# Patient Record
Sex: FEMALE | Race: BLACK OR AFRICAN AMERICAN | NOT HISPANIC OR LATINO
[De-identification: names, ages, dates, MRNs, and addresses within clinical notes are randomized per-mention and may not be internally consistent; named-entity substitution may affect disease eponyms.]

---

## 2021-05-10 ENCOUNTER — APPOINTMENT (OUTPATIENT)
Dept: NEUROLOGY | Facility: CLINIC | Age: 31
End: 2021-05-10
Payer: COMMERCIAL

## 2021-05-10 VITALS — WEIGHT: 254 LBS | HEIGHT: 66 IN | BODY MASS INDEX: 40.82 KG/M2

## 2021-05-10 DIAGNOSIS — I63.9 CEREBRAL INFARCTION, UNSPECIFIED: ICD-10-CM

## 2021-05-10 DIAGNOSIS — Z82.3 FAMILY HISTORY OF STROKE: ICD-10-CM

## 2021-05-10 DIAGNOSIS — G45.9 TRANSIENT CEREBRAL ISCHEMIC ATTACK, UNSPECIFIED: ICD-10-CM

## 2021-05-10 PROCEDURE — 93880 EXTRACRANIAL BILAT STUDY: CPT

## 2021-05-10 PROCEDURE — 99072 ADDL SUPL MATRL&STAF TM PHE: CPT

## 2021-05-10 PROCEDURE — 99205 OFFICE O/P NEW HI 60 MIN: CPT

## 2021-05-10 RX ORDER — RIBOFLAVIN (VITAMIN B2) 400 MG
400 TABLET ORAL
Qty: 30 | Refills: 0 | Status: ACTIVE | COMMUNITY
Start: 2021-04-14

## 2021-05-10 RX ORDER — MAGNESIUM OXIDE 241.3 MG/1000MG
400 TABLET ORAL
Qty: 30 | Refills: 0 | Status: ACTIVE | COMMUNITY
Start: 2021-04-14

## 2021-05-12 ENCOUNTER — NON-APPOINTMENT (OUTPATIENT)
Age: 31
End: 2021-05-12

## 2021-05-18 ENCOUNTER — NON-APPOINTMENT (OUTPATIENT)
Age: 31
End: 2021-05-18

## 2021-05-18 ENCOUNTER — APPOINTMENT (OUTPATIENT)
Dept: NEUROLOGY | Facility: CLINIC | Age: 31
End: 2021-05-18

## 2021-05-19 ENCOUNTER — APPOINTMENT (OUTPATIENT)
Dept: NEUROLOGY | Facility: CLINIC | Age: 31
End: 2021-05-19
Payer: COMMERCIAL

## 2021-05-19 VITALS — TEMPERATURE: 98 F

## 2021-05-19 PROCEDURE — 93892 TCD EMBOLI DETECT W/O INJ: CPT

## 2021-05-19 PROCEDURE — 93886 INTRACRANIAL COMPLETE STUDY: CPT

## 2021-06-02 ENCOUNTER — TRANSCRIPTION ENCOUNTER (OUTPATIENT)
Age: 31
End: 2021-06-02

## 2021-06-06 ENCOUNTER — OUTPATIENT (OUTPATIENT)
Dept: OUTPATIENT SERVICES | Facility: HOSPITAL | Age: 31
LOS: 1 days | End: 2021-06-06
Payer: COMMERCIAL

## 2021-06-06 ENCOUNTER — APPOINTMENT (OUTPATIENT)
Dept: MRI IMAGING | Facility: CLINIC | Age: 31
End: 2021-06-06
Payer: COMMERCIAL

## 2021-06-06 DIAGNOSIS — I63.9 CEREBRAL INFARCTION, UNSPECIFIED: ICD-10-CM

## 2021-06-06 PROCEDURE — 70544 MR ANGIOGRAPHY HEAD W/O DYE: CPT | Mod: 26,59

## 2021-06-06 PROCEDURE — 70551 MRI BRAIN STEM W/O DYE: CPT | Mod: 26

## 2021-06-06 PROCEDURE — 70544 MR ANGIOGRAPHY HEAD W/O DYE: CPT

## 2021-06-06 PROCEDURE — 70547 MR ANGIOGRAPHY NECK W/O DYE: CPT | Mod: 26

## 2021-06-06 PROCEDURE — 70551 MRI BRAIN STEM W/O DYE: CPT

## 2021-06-06 PROCEDURE — 70547 MR ANGIOGRAPHY NECK W/O DYE: CPT

## 2021-06-07 ENCOUNTER — APPOINTMENT (OUTPATIENT)
Dept: NEUROLOGY | Facility: CLINIC | Age: 31
End: 2021-06-07
Payer: COMMERCIAL

## 2021-06-07 VITALS
HEIGHT: 66 IN | BODY MASS INDEX: 41.62 KG/M2 | HEART RATE: 84 BPM | WEIGHT: 259 LBS | SYSTOLIC BLOOD PRESSURE: 133 MMHG | DIASTOLIC BLOOD PRESSURE: 82 MMHG

## 2021-06-07 DIAGNOSIS — R93.0 ABNORMAL FINDINGS ON DIAGNOSTIC IMAGING OF SKULL AND HEAD, NOT ELSEWHERE CLASSIFIED: ICD-10-CM

## 2021-06-07 DIAGNOSIS — Z78.9 OTHER SPECIFIED HEALTH STATUS: ICD-10-CM

## 2021-06-07 DIAGNOSIS — R03.0 ELEVATED BLOOD-PRESSURE READING, W/OUT DIAGNOSIS OF HYPERTENSION: ICD-10-CM

## 2021-06-07 DIAGNOSIS — G44.59 OTHER COMPLICATED HEADACHE SYNDROME: ICD-10-CM

## 2021-06-07 DIAGNOSIS — Z00.00 ENCOUNTER FOR GENERAL ADULT MEDICAL EXAMINATION W/OUT ABNORMAL FINDINGS: ICD-10-CM

## 2021-06-07 PROCEDURE — 99215 OFFICE O/P EST HI 40 MIN: CPT

## 2021-06-07 NOTE — HISTORY OF PRESENT ILLNESS
[FreeTextEntry1] : Patient reports that she is here for evaluation of numbness and tingling in her arms and legs with associated headaches. 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 \par \par Currently she denies any headaches or nausea or vomiting and no visual changes currently. No leg weakness or numbness\par No dizziness or pain reported.

## 2021-06-07 NOTE — DISCUSSION/SUMMARY
[FreeTextEntry1] : 1. Patient with neurologic symptoms of headaches and numbness and tingling intermittently in right arm and bilateral legs\par \par MRI reviewed in detail with neuroradiology and showed no acute infarct however she had a couple of nonspecific white matter changes and I will monitor them regularly every year\par \par 2. Obtain MRI cervical and thoracic spine without contrast \par \par 3. Keep headache diary and neurologic symptom diary

## 2021-06-07 NOTE — REVIEW OF SYSTEMS
[Fever] : no fever [Chills] : no chills [Feeling Tired] : not feeling tired [Abnormal Sensation] : no abnormal sensation [Lightheadedness] : no lightheadedness [Dizziness] : no dizziness [Migraine Headache] : no migraine headache [Tension Headache] : tension-type headaches [Difficulty Walking] : no difficulty walking [As Noted in HPI] : as noted in HPI [Negative] : Heme/Lymph

## 2021-06-07 NOTE — PHYSICAL EXAM
[General Appearance - Alert] : alert [Oriented To Time, Place, And Person] : oriented to person, place, and time [Person] : oriented to person [Place] : oriented to place [Time] : oriented to time [Cranial Nerves Optic (II)] : visual acuity intact bilaterally,  visual fields full to confrontation, pupils equal round and reactive to light [Cranial Nerves Trigeminal (V)] : facial sensation intact symmetrically [Cranial Nerves Vestibulocochlear (VIII)] : hearing was intact bilaterally [Cranial Nerves Glossopharyngeal (IX)] : tongue and palate midline [Cranial Nerves Accessory (XI - Cranial And Spinal)] : head turning and shoulder shrug symmetric [Motor Tone] : muscle tone was normal in all four extremities [Motor Strength] : muscle strength was normal in all four extremities [Involuntary Movements] : no involuntary movements were seen [Motor Handedness Right-Handed] : the patient is right hand dominant [Paresis Pronator Drift Right-Sided] : no pronator drift on the right [Paresis Pronator Drift Left-Sided] : no pronator drift on the left [Romberg's Sign] : Romberg's sign was negtive [Limited Balance] : the patient's balance was impaired [Coordination - Dysmetria Impaired Finger-to-Nose Bilateral] : not present [2+] : Patella left 2+ [1+] : Ankle jerk left 1+ [FreeTextEntry5] : slow saccades and nystagmus noted on left lateral gaze  [FreeTextEntry7] : slightly decreased sensation on right arm to pinprick compared to left  [FreeTextEntry8] : patient with difficulty with tandem gait [Neck Appearance] : the appearance of the neck was normal [] : no rash [Skin Lesions] : no skin lesions

## 2021-07-09 ENCOUNTER — APPOINTMENT (OUTPATIENT)
Dept: NEUROLOGY | Facility: CLINIC | Age: 31
End: 2021-07-09
Payer: COMMERCIAL

## 2021-07-09 VITALS
BODY MASS INDEX: 41.62 KG/M2 | HEART RATE: 77 BPM | DIASTOLIC BLOOD PRESSURE: 79 MMHG | WEIGHT: 259 LBS | HEIGHT: 66 IN | SYSTOLIC BLOOD PRESSURE: 111 MMHG

## 2021-07-09 DIAGNOSIS — R20.2 PARESTHESIA OF SKIN: ICD-10-CM

## 2021-07-09 DIAGNOSIS — G43.909 MIGRAINE, UNSPECIFIED, NOT INTRACTABLE, W/OUT STATUS MIGRAINOSUS: ICD-10-CM

## 2021-07-09 DIAGNOSIS — R53.83 OTHER FATIGUE: ICD-10-CM

## 2021-07-09 PROCEDURE — 99214 OFFICE O/P EST MOD 30 MIN: CPT

## 2021-07-09 NOTE — DISCUSSION/SUMMARY
[FreeTextEntry1] : 1. Headaches : resolved with magnesium and will continue to monitor \par \par 2. fatigue : send blood work for B12, thiamine, folate and CRP\par \par 3. Patient may return to work on Monday July 12 th 2021\par \par 4. Follow up in 4 months and all questions answered.

## 2021-07-09 NOTE — PHYSICAL EXAM
[General Appearance - Alert] : alert [Oriented To Time, Place, And Person] : oriented to person, place, and time [Person] : oriented to person [Place] : oriented to place [Time] : oriented to time [Cranial Nerves Optic (II)] : visual acuity intact bilaterally,  visual fields full to confrontation, pupils equal round and reactive to light [Cranial Nerves Trigeminal (V)] : facial sensation intact symmetrically [Cranial Nerves Vestibulocochlear (VIII)] : hearing was intact bilaterally [Cranial Nerves Glossopharyngeal (IX)] : tongue and palate midline [Cranial Nerves Accessory (XI - Cranial And Spinal)] : head turning and shoulder shrug symmetric [Motor Tone] : muscle tone was normal in all four extremities [Motor Strength] : muscle strength was normal in all four extremities [Involuntary Movements] : no involuntary movements were seen [Motor Handedness Right-Handed] : the patient is right hand dominant [Paresis Pronator Drift Right-Sided] : no pronator drift on the right [Paresis Pronator Drift Left-Sided] : no pronator drift on the left [Romberg's Sign] : Romberg's sign was negtive [Limited Balance] : the patient's balance was impaired [Coordination - Dysmetria Impaired Finger-to-Nose Bilateral] : not present [2+] : Patella left 2+ [1+] : Ankle jerk left 1+ [FreeTextEntry5] : slow saccades but no nystagmus  [FreeTextEntry7] : slightly decreased sensation on right arm to pinprick compared to left  [Extraocular Movements] : extraocular movements were intact [Neck Appearance] : the appearance of the neck was normal [] : no rash [Skin Lesions] : no skin lesions

## 2021-07-09 NOTE — HISTORY OF PRESENT ILLNESS
[FreeTextEntry1] : Patient reports that since last visit she has been having increased pain in her neck and extreme fatigue. She was found to have low vitamin D. \par Her headaches have improved with magnesium daily. \par \par She completed her MRI cervical spine and it showed C3-C4 and C4-C5 central disc bulges but no shane nerve root compression \par \par MRI thoracic spine was normal \par \par She will have stiffness intermittently in her neck and overall she is feeling better with the numbness and tingling

## 2021-07-09 NOTE — REVIEW OF SYSTEMS
[Fever] : no fever [Chills] : no chills [Feeling Tired] : feeling tired [As Noted in HPI] : as noted in HPI [Poor Coordination] : good coordination [Abnormal Sensation] : no abnormal sensation [Dizziness] : no dizziness [Lightheadedness] : no lightheadedness [Migraine Headache] : no migraine headache [Difficulty Walking] : no difficulty walking [Sleep Disturbances] : no sleep disturbances [Joint Pain] : joint pain [Negative] : Heme/Lymph

## 2021-09-09 ENCOUNTER — APPOINTMENT (OUTPATIENT)
Dept: NEUROLOGY | Facility: CLINIC | Age: 31
End: 2021-09-09

## 2021-10-18 ENCOUNTER — APPOINTMENT (OUTPATIENT)
Dept: NEUROLOGY | Facility: CLINIC | Age: 31
End: 2021-10-18

## 2021-11-09 ENCOUNTER — APPOINTMENT (OUTPATIENT)
Dept: NEUROLOGY | Facility: CLINIC | Age: 31
End: 2021-11-09

## 2021-11-24 ENCOUNTER — APPOINTMENT (OUTPATIENT)
Dept: NEUROLOGY | Facility: CLINIC | Age: 31
End: 2021-11-24
Payer: COMMERCIAL

## 2021-11-24 PROCEDURE — 95910 NRV CNDJ TEST 7-8 STUDIES: CPT

## 2021-11-24 PROCEDURE — 95885 MUSC TST DONE W/NERV TST LIM: CPT

## 2022-02-15 ENCOUNTER — APPOINTMENT (OUTPATIENT)
Dept: NEUROLOGY | Facility: CLINIC | Age: 32
End: 2022-02-15

## 2022-02-19 ENCOUNTER — TRANSCRIPTION ENCOUNTER (OUTPATIENT)
Age: 32
End: 2022-02-19

## 2023-12-19 ENCOUNTER — NON-APPOINTMENT (OUTPATIENT)
Age: 33
End: 2023-12-19

## 2024-01-02 ENCOUNTER — NON-APPOINTMENT (OUTPATIENT)
Age: 34
End: 2024-01-02

## 2024-07-10 ENCOUNTER — APPOINTMENT (OUTPATIENT)
Dept: NEUROLOGY | Facility: CLINIC | Age: 34
End: 2024-07-10
Payer: COMMERCIAL

## 2024-07-10 ENCOUNTER — NON-APPOINTMENT (OUTPATIENT)
Age: 34
End: 2024-07-10

## 2024-07-10 VITALS
SYSTOLIC BLOOD PRESSURE: 131 MMHG | HEIGHT: 66 IN | DIASTOLIC BLOOD PRESSURE: 81 MMHG | TEMPERATURE: 97.8 F | OXYGEN SATURATION: 99 % | WEIGHT: 260 LBS | RESPIRATION RATE: 17 BRPM | HEART RATE: 72 BPM | BODY MASS INDEX: 41.78 KG/M2

## 2024-07-10 DIAGNOSIS — R93.0 ABNORMAL FINDINGS ON DIAGNOSTIC IMAGING OF SKULL AND HEAD, NOT ELSEWHERE CLASSIFIED: ICD-10-CM

## 2024-07-10 DIAGNOSIS — G43.909 MIGRAINE, UNSPECIFIED, NOT INTRACTABLE, W/OUT STATUS MIGRAINOSUS: ICD-10-CM

## 2024-07-10 DIAGNOSIS — R56.9 UNSPECIFIED CONVULSIONS: ICD-10-CM

## 2024-07-10 PROCEDURE — 99204 OFFICE O/P NEW MOD 45 MIN: CPT

## 2024-07-24 ENCOUNTER — APPOINTMENT (OUTPATIENT)
Dept: NEUROLOGY | Facility: CLINIC | Age: 34
End: 2024-07-24

## 2024-07-25 ENCOUNTER — APPOINTMENT (OUTPATIENT)
Dept: NEUROLOGY | Facility: CLINIC | Age: 34
End: 2024-07-25

## 2024-09-09 ENCOUNTER — OUTPATIENT (OUTPATIENT)
Dept: OUTPATIENT SERVICES | Facility: HOSPITAL | Age: 34
LOS: 1 days | Discharge: STILL ADMITTED OR ADMINISTRATIVE | End: 2024-09-09

## 2024-09-26 DIAGNOSIS — F43.11 POST-TRAUMATIC STRESS DISORDER, ACUTE: ICD-10-CM

## 2024-12-02 ENCOUNTER — OUTPATIENT (OUTPATIENT)
Dept: OUTPATIENT SERVICES | Facility: HOSPITAL | Age: 34
LOS: 1 days | Discharge: ANOTHER HOSPITAL | End: 2024-12-02
Payer: MEDICARE

## 2024-12-02 PROCEDURE — 90839 PSYTX CRISIS INITIAL 60 MIN: CPT

## 2024-12-03 DIAGNOSIS — F43.11 POST-TRAUMATIC STRESS DISORDER, ACUTE: ICD-10-CM

## 2025-02-05 ENCOUNTER — OUTPATIENT (OUTPATIENT)
Dept: OUTPATIENT SERVICES | Facility: HOSPITAL | Age: 35
LOS: 1 days | Discharge: HOME | End: 2025-02-05
Payer: MEDICARE

## 2025-02-05 PROCEDURE — 90792 PSYCH DIAG EVAL W/MED SRVCS: CPT

## 2025-02-06 DIAGNOSIS — F41.1 GENERALIZED ANXIETY DISORDER: ICD-10-CM

## 2025-02-21 ENCOUNTER — INPATIENT (INPATIENT)
Facility: HOSPITAL | Age: 35
LOS: 12 days | Discharge: HOME | End: 2025-03-06
Attending: PSYCHIATRY & NEUROLOGY | Admitting: PSYCHIATRY & NEUROLOGY
Payer: MEDICAID

## 2025-02-21 VITALS — WEIGHT: 179.9 LBS | TEMPERATURE: 99 F | HEIGHT: 66 IN

## 2025-02-21 DIAGNOSIS — J45.909 UNSPECIFIED ASTHMA, UNCOMPLICATED: ICD-10-CM

## 2025-02-21 DIAGNOSIS — F41.1 GENERALIZED ANXIETY DISORDER: ICD-10-CM

## 2025-02-21 LAB
FLUAV AG NPH QL: SIGNIFICANT CHANGE UP
FLUBV AG NPH QL: SIGNIFICANT CHANGE UP
RSV RNA NPH QL NAA+NON-PROBE: SIGNIFICANT CHANGE UP
SARS-COV-2 RNA SPEC QL NAA+PROBE: SIGNIFICANT CHANGE UP

## 2025-02-21 PROCEDURE — 99215 OFFICE O/P EST HI 40 MIN: CPT

## 2025-02-21 PROCEDURE — 90833 PSYTX W PT W E/M 30 MIN: CPT

## 2025-02-21 PROCEDURE — 99222 1ST HOSP IP/OBS MODERATE 55: CPT

## 2025-02-21 RX ORDER — ALBUTEROL SULFATE 2.5 MG/3ML
2 VIAL, NEBULIZER (ML) INHALATION EVERY 6 HOURS
Refills: 0 | Status: DISCONTINUED | OUTPATIENT
Start: 2025-02-21 | End: 2025-03-06

## 2025-02-21 RX ORDER — ACETAMINOPHEN 500 MG/5ML
650 LIQUID (ML) ORAL EVERY 6 HOURS
Refills: 0 | Status: DISCONTINUED | OUTPATIENT
Start: 2025-02-21 | End: 2025-03-06

## 2025-02-21 RX ORDER — LORAZEPAM 4 MG/ML
2 VIAL (ML) INJECTION ONCE
Refills: 0 | Status: DISCONTINUED | OUTPATIENT
Start: 2025-02-21 | End: 2025-02-25

## 2025-02-21 RX ORDER — MELATONIN 5 MG
5 TABLET ORAL AT BEDTIME
Refills: 0 | Status: DISCONTINUED | OUTPATIENT
Start: 2025-02-21 | End: 2025-03-06

## 2025-02-21 RX ORDER — LORAZEPAM 4 MG/ML
2 VIAL (ML) INJECTION EVERY 6 HOURS
Refills: 0 | Status: DISCONTINUED | OUTPATIENT
Start: 2025-02-21 | End: 2025-02-25

## 2025-02-21 NOTE — BH INPATIENT PSYCHIATRY ASSESSMENT NOTE - NSBHASSESSSUMMFT_PSY_ALL_CORE
33 yo female with BRYAN, no prior hospitalizations, no past SA, no hx of violence, medical hx of HTN, recurring kidney stones, asthma, admitted voluntarily from [facility] walk in for treatment of worsening anxiety associated with psychosocial stressors.     Patients presentation meets criteria for BRYAN with functional impairments such that inpatient stabilization will be beneficial. Hospitalization will aid with symptom stabilization, treatment optimization, coordination of outpatient services. no CO indicated at this time. Pt was offered Lexapro but she ultimately declines. Will explore further.     Plan:   Standard OBS   Pt declines psychopharm meds at this time   Lorazepam PRN for agitation   IGM therapy   Coordinate with s/w and rest of team

## 2025-02-21 NOTE — BH INPATIENT PSYCHIATRY ASSESSMENT NOTE - HPI (INCLUDE ILLNESS QUALITY, SEVERITY, DURATION, TIMING, CONTEXT, MODIFYING FACTORS, ASSOCIATED SIGNS AND SYMPTOMS)
34-year-old female, single, domiciled alone, non-caregiver,  for a college, PPH Anxiety, Depression, PTSD in chart; no previous psychiatric admissions, denies previous suicide attempts, denies NSSIB, [facility]/24 to establish care, previously engaged in therapy x10 years last seen in May 2024, brief med trial on Fluoxetine 10 mg but discontinued in 12/2024, admitted 9.13 for worsening anxiety and inability to function.     On arrival to , patient reports struggling with anxiety for several months, unable to identify a trigger. She states symptoms include muscle tension, excessive worries, worries about worries, social isolation. Patient also reports late insomnia, poor appetite, loss of interest. Firmly denies SI. she denies AVH. when asked of paranoia, she pauses and ultimately denies feeling paranoid, but states “some people aren’t nice.” She also feels he has been having a lot of trouble leaving her home. Asked of her goals for inpatient stay, she reports she wants exposure therapy and psychotherapy to help her not feel so socially anxious. We discuss medications, risks, benefits, side effects. We discuss trialing Lexapro. She agrees to trial, but then shortly after comes up to nurses and asks that the order not be put.     Per [facility] note:   Today, Pt presents for a walk in appt requesting voluntary hospitalization for worsening anxiety impairing her ability to care for herself, lost 14 pounds unintentionally. Acute stressors include feeling "bullied by colleagues". Pt odd at times, but not internally preoccupied, no evidence of ness or psychosis. Denied any SIIP, NSSIB, or HIIP. Total of 45 mins spent today assessing symptoms, reviewing expectations for medications & hospitalization, psychoeducation, supportive therapy, safety planning, and treatment planning.     MSE: Pt is alert, oriented x 3, calm and mostly cooperative, somewhat aloof, oddly related, casually dressed, fair eye contact with exotropia. Slow gait, no abnormal movements noted. Speech is spontaneous with normal rate, volume, tone. Mood is "anxious." Affect is anxious, restricted, mood congruent. Thought process is linear, future oriented. No evidence of delusional thought content. Denies any suicidal or violent ideation, intent, or plan. No auditory or visual hallucinations. Attention appropriate. Recent memory intact by interview. Fund of knowledge intact. Insight and judgment fair.    PMH: high blood pressure, migraines, asthma, hx of recurring kidney stones, anemia; no meds, allergies to contrast dye, soy and dairy, referred to clinic to establish care with individual therapy. Available charts reviewed. Current diagnostic impression is consistent with generalized anxiety disorder vs PTSD, we did not dive into details about her trauma at this first visit, PTSD is a reasonable diagnosis given her avoidance of crowds, hypervigilance; will continue to explore.

## 2025-02-21 NOTE — BH INPATIENT PSYCHIATRY ASSESSMENT NOTE - OTHER PAST PSYCHIATRIC HISTORY (INCLUDE DETAILS REGARDING ONSET, COURSE OF ILLNESS, INPATIENT/OUTPATIENT TREATMENT)
BRYAN, no prior hospitalizations. no past SA. previously in therapy. first connected to psychotherapy in 2015 for grief counseling after her father had passed away. brief trial of Fluoxetine 10mg discontinued to due intolerance

## 2025-02-21 NOTE — BH INPATIENT PSYCHIATRY ASSESSMENT NOTE - NSSUICPROTFACT_PSY_ALL_CORE
Neuro
Responsibility to children, family, or others/Identifies reasons for living/Supportive social network of family or friends/Fear of death or the actual act of killing self/Cultural, spiritual and/or moral attitudes against suicide/Engaged in work or school/Positive therapeutic relationships

## 2025-02-21 NOTE — BH INPATIENT PSYCHIATRY ASSESSMENT NOTE - RISK ASSESSMENT
Risk factors for self-harm / harm to others: biological sex, psychiatric diagnosis  Protective factors: inpatient and in a controlled setting with limited access to lethal means. not endorsing harm to self or others, able to contract for safety, fear of death.

## 2025-02-21 NOTE — BH PATIENT PROFILE - FALL HARM RISK - UNIVERSAL INTERVENTIONS
Bed in lowest position, wheels locked, appropriate side rails in place/Call bell, personal items and telephone in reach/Instruct patient to call for assistance before getting out of bed or chair/Non-slip footwear when patient is out of bed/[place] to call system/Physically safe environment - no spills, clutter or unnecessary equipment/Purposeful Proactive Rounding/Room/bathroom lighting operational, light cord in reach

## 2025-02-21 NOTE — BH INPATIENT PSYCHIATRY ASSESSMENT NOTE - CURRENT MEDICATION
MEDICATIONS  (STANDING):    MEDICATIONS  (PRN):  acetaminophen     Tablet .. 650 milliGRAM(s) Oral every 6 hours PRN Mild Pain (1 - 3), Moderate Pain (4 - 6)  LORazepam     Tablet 2 milliGRAM(s) Oral every 6 hours PRN agitation  LORazepam   Injectable 2 milliGRAM(s) IntraMuscular once PRN severe agitation  melatonin. 5 milliGRAM(s) Oral at bedtime PRN Insomnia

## 2025-02-21 NOTE — BH INPATIENT PSYCHIATRY ASSESSMENT NOTE - MSE UNSTRUCTURED FT
Appearance: Dressed appropriately.  Attitude / Behavior / relatedness: oddly-related. calm   Eye contact: good   Motor: No abnormal movements, no psychomotor slowing or activation.  Speech: Regular rate.  Mood: "fine"  Affect: anxious  Thought Process: linear.   Thought Content: devoid of SI and HI. +preoccupations   Perceptual: denies avh  Insight: fair   Judgment: good   Impulse control: good   Cognition: grossly intact  Gait: Intact.

## 2025-02-21 NOTE — CHART NOTE - NSCHARTNOTEFT_GEN_A_CORE
Screening Medical Evaluation    Patient Admitted from: [facility] admitting diagnosis: Generalized anxiety disorder      PAST MEDICAL & SURGICAL HISTORY:        Allergies    Soy (Stomach Upset)  IV Contrast (Anaphylaxis)    Intolerances          Social History:       FAMILY HISTORY:        MEDICATIONS  (STANDING):    MEDICATIONS  (PRN):  acetaminophen     Tablet .. 650 milliGRAM(s) Oral every 6 hours PRN Mild Pain (1 - 3), Moderate Pain (4 - 6)  LORazepam     Tablet 2 milliGRAM(s) Oral every 6 hours PRN agitation  LORazepam   Injectable 2 milliGRAM(s) IntraMuscular once PRN severe agitation  melatonin. 5 milliGRAM(s) Oral at bedtime PRN Insomnia        Vital Signs Last 24 Hrs  T(C): 37 (21 Feb 2025 16:21), Max: 37 (21 Feb 2025 16:21)  T(F): 98.6 (21 Feb 2025 16:21), Max: 98.6 (21 Feb 2025 16:21)  HR: --  BP: --  BP(mean): --  RR: --  SpO2: --      CAPILLARY BLOOD GLUCOSE            PHYSICAL EXAM:  GENERAL: NAD, well-developed  HEAD:  Atraumatic, Normocephalic  EYES: EOMI, PERRLA, conjunctiva and sclera clear  NECK: Supple, No JVD  CHEST/LUNG: Clear to auscultation bilaterally; No wheeze  HEART: Regular rate and rhythm; No murmurs, rubs, or gallops  ABDOMEN: Soft, Nontender, Nondistended; Bowel sounds present  EXTREMITIES:  2+ Peripheral Pulses, No clubbing, cyanosis, or edema  PSYCH: AAOx3  NEUROLOGY: non-focal  SKIN: No rashes or lesions    LABS:                    RADIOLOGY & ADDITIONAL TESTS:      Assessment and Plan:  __M/F admitted to [facility] for _____ w/ PMHx of ____ seen at bedside for medical screening evaluation. Patient has no acute complaints at this time. Patient denies fever, chills, headache, dizziness, lightheadedness, N/V, SOB, cough, congestion, chest pain, abdominal pain, dysuria, hematuria, diarrhea, constipation. Physical exam unremarkable, VSS. Labs _____. EKG _____.     1.)   2.)  3.) Screening Medical Evaluation    Patient Admitted from: [facility] admitting diagnosis: Generalized anxiety disorder      PAST MEDICAL & SURGICAL HISTORY:    Asthma.     Allergies    Soy (Stomach Upset)  IV Contrast (Anaphylaxis)    Intolerances          Social History:       FAMILY HISTORY:        MEDICATIONS  (STANDING):    MEDICATIONS  (PRN):  acetaminophen     Tablet .. 650 milliGRAM(s) Oral every 6 hours PRN Mild Pain (1 - 3), Moderate Pain (4 - 6)  LORazepam     Tablet 2 milliGRAM(s) Oral every 6 hours PRN agitation  LORazepam   Injectable 2 milliGRAM(s) IntraMuscular once PRN severe agitation  melatonin. 5 milliGRAM(s) Oral at bedtime PRN Insomnia        Vital Signs Last 24 Hrs  T(C): 37 (21 Feb 2025 16:21), Max: 37 (21 Feb 2025 16:21)  T(F): 98.6 (21 Feb 2025 16:21), Max: 98.6 (21 Feb 2025 16:21)  HR: -- 91b/ min   BP: -- 122/ 90   BP(mean): --  RR: -- 16b/ min       CAPILLARY BLOOD GLUCOSE            PHYSICAL EXAM: Limited due to pt uncooperative.   GENERAL: NAD  HEAD:  Atraumatic, Normocephalic  EYES: Strabismus.   NECK: No JVD  CHEST/LUNG: Pt refused lung exam.   HEART: Pt refused heart exam.   ABDOMEN: PT refused abdomen exam. t  PSYCH: Uncooperative.   NEUROLOGY: Does not respond to questions. Only nods.   SKIN: No rashes or lesions seen onexposed skin.     LABS:                    RADIOLOGY & ADDITIONAL TESTS:      Assessment and Plan:  34F admitted to [facility] for Generalized anxiety disorder. PMHx of asthma.  Pt seen for medical screening evaluation. Patient offers no complaints when asked. Physical exam limited due to pt uncooperative.  VSS. Labs pending. EKG pending.     1.) Asthma: Proair rescue inhaler PRN.   2.) Generalized anxiety disorder: F/U EKG to assess QT/ QTC. Plan per primary team. 73

## 2025-02-22 DIAGNOSIS — F29 UNSPECIFIED PSYCHOSIS NOT DUE TO A SUBSTANCE OR KNOWN PHYSIOLOGICAL CONDITION: ICD-10-CM

## 2025-02-22 PROCEDURE — 99232 SBSQ HOSP IP/OBS MODERATE 35: CPT

## 2025-02-22 RX ORDER — RISPERIDONE 4 MG
0.5 TABLET ORAL AT BEDTIME
Refills: 0 | Status: DISCONTINUED | OUTPATIENT
Start: 2025-02-22 | End: 2025-02-26

## 2025-02-22 NOTE — PSYCHIATRIC REHAB INITIAL EVALUATION - NSBHEMPDEFICITSFT_PSY_ALL_CORE
Name And Contact Information For Health Care Proxy: Henrry Marquez daughter [phone] Quality 431: Preventive Care And Screening: Unhealthy Alcohol Use - Screening: Documentation of medical reason(s) for not screening for unhealthy alcohol use (eg, limited life expectancy, other medical reasons) Quality 111:Pneumonia Vaccination Status For Older Adults: Pneumococcal Vaccination Previously Received Quality 130: Documentation Of Current Medications In The Medical Record: Current Medications Documented Quality 226: Preventive Care And Screening: Tobacco Use: Screening And Cessation Intervention: Patient screened for tobacco use and is an ex/non-smoker Detail Level: Detailed Quality 47: Advance Care Plan: Advance Care Planning discussed and documented; advance care plan or surrogate decision maker documented in the medical record. Unable to obtain  Information not provided by patient

## 2025-02-22 NOTE — PSYCHIATRIC REHAB INITIAL EVALUATION - PRO INTERPRETER NEED 2
I reviewed the H&P, I examined the patient, and there are no changes in the patient's condition.     English

## 2025-02-22 NOTE — PSYCHIATRIC REHAB INITIAL EVALUATION - PRIMARY SOURCE OF SUPPORT/COMFORT
As per patient, patient has numerous support, but withheld to give names or relationship. As per patient, patient has numerous support, but withheld to give names or relationship./no one

## 2025-02-22 NOTE — PSYCHIATRIC REHAB INITIAL EVALUATION - NSBHPRRECOMMEND_PSY_ALL_CORE
Writer met with patient to orient patient to unit as well as the role/function of Activities Specialists and Inpatient Psychiatric Rehabilitation programming. Patient demonstrated minimal engagement with writer during initial session. Patient presented as appropriately dressed with a severely anxious appearance. Patient was not able to establish appropriate rehabilitation goal within the context of presenting symptoms. Writer therefore identified an appropriate goal for patient which is to take mediations as prescribed. Psychiatric Rehabilitation staff will meet with patient weekly to review progress towards goal. Writer met with patient to orient patient to unit as well as the role/function of Activities Specialists and Inpatient Psychiatric Rehabilitation programming. Patient demonstrated minimal engagement with writer during initial session. Patient presented as appropriately dressed with a severely anxious appearance. Patient was not able to establish appropriate rehabilitation goal within the context of presenting symptoms. Writer therefore identified an appropriate goal for patient as to take mediations as prescribed. Psychiatric Rehabilitation staff will meet with patient weekly to review progress towards goal. Writer met with patient to orient patient to unit 2N as well as the role/function of Activities Specialists and Inpatient Psychiatric Rehabilitation programming. Patient demonstrated minimal engagement with writer during initial session. Patient presented as appropriately dressed with a severely anxious appearance. Patient was not able to establish appropriate rehabilitation goal within the context of presenting symptoms. Writer therefore identified an appropriate goal for patient as to take mediations as prescribed. Psychiatric Rehabilitation staff will meet with patient weekly to review progress towards goal.

## 2025-02-23 PROCEDURE — 99232 SBSQ HOSP IP/OBS MODERATE 35: CPT

## 2025-02-24 VITALS — TEMPERATURE: 98 F

## 2025-02-24 DIAGNOSIS — F41.1 GENERALIZED ANXIETY DISORDER: ICD-10-CM

## 2025-02-24 PROCEDURE — 99232 SBSQ HOSP IP/OBS MODERATE 35: CPT

## 2025-02-24 PROCEDURE — 90853 GROUP PSYCHOTHERAPY: CPT

## 2025-02-24 NOTE — DIETITIAN INITIAL EVALUATION ADULT - OTHER INFO
Patient is a 35 y/o female with PPHx of BRYAN, no prior hospitalizations, no past SA, no hx of violence, medical hx of HTN, recurring kidney stones, asthma, admitted voluntarily from [facility] walk in for treatment of worsening anxiety associated with psychosocial stressors. Admitted to [facility] on 2/21/25. Of note, patient stated unintentional weight loss of approximately 14 pounds over last few months.     Limited collateral/diet hx obtained from patient today as patient was guarded during encounter. Only told writer that she is allergic to soy and is lactose intolerance. Patient then stated, "I prefer not to meet....I don't want to speak," and self-terminated the interview prematurely. As per chart review, patient reported poor appetite PTA (duration uncertain, possibly iso anxiety over several months). Current adm wt 180lb (2/21/25), suspect wt -14lb (-7.2%) x past few months likely related to decreased po intake PTA, but unable to confirm with patient at this time. As per nursing staff, patient has been having fair po intake at meals on the unit. No reports of chewing/swallowing difficulties on current diet. No specific food preferences obtained today. No GI distress (nausea/vomiting/diarrhea/ constipation) reported at this time. Patient is not a candidate for diet education during encounter.

## 2025-02-24 NOTE — BH PSYCHOLOGY - GROUP THERAPY NOTE - NSBHPSYCHOLRESPCOMMENT_PSY_A_CORE FT
The patient appeared adequately groomed and casually dressed. Pt appeared moderately engaged in group as evidenced by her willingness to verbally communicate during the discussion when called upon. Pt was oriented X3. Pt was appropriate with peers.

## 2025-02-24 NOTE — BH SOCIAL WORK INITIAL PSYCHOSOCIAL EVALUATION - DETAILS
----- Message from Irasema Snow NP sent at 6/18/2020  9:37 AM EDT -----  Pls obtain labs from pcp thanks
Faxed as requested.
Mother and Father-Depression and Anxiety

## 2025-02-24 NOTE — BH PSYCHOLOGY - GROUP THERAPY NOTE - NSPSYCHOLGRPCOGPT_PSY_A_CORE FT
Patient attended Cognitive Behavioral Therapy Group incorporating ACT-based concepts. The group started with a brief check-in asking Pts to share a food that brings back positive memories, as well as the associated memory. Members then engaged in a mindful stretching exercise and processed the experience afterwards. Group engaged in discussion about values and identifying values, and were provided with a handout asking them to rate some common values that people hold. Pts then engaged in values and committed actions worksheet and discussed their experiences. Group facilitator explained concepts, reinforced participation, and engaged patients in the discussion.

## 2025-02-24 NOTE — BH PSYCHOLOGY - GROUP THERAPY NOTE - TOKEN PULL-DIAGNOSIS
Primary Diagnosis:  Psychosis, unspecified psychosis type [F29]      BRYAN (generalized anxiety disorder) [F41.1]        Problem Dx:   BRYAN (generalized anxiety disorder) [F41.1]      Psychosis [F29]      Asthma [J45.909]

## 2025-02-24 NOTE — DIETITIAN INITIAL EVALUATION ADULT - ADD RECOMMEND
- Encourage adequate po intake as tolerated and honor food preferences PRN.   - Monitor PO intake/tolerance, weights, labs, BM's, and skin integrity.

## 2025-02-24 NOTE — DIETITIAN INITIAL EVALUATION ADULT - PERTINENT MEDS FT
MEDICATIONS  (STANDING):  risperiDONE   Tablet 0.5 milliGRAM(s) Oral at bedtime    MEDICATIONS  (PRN):  acetaminophen     Tablet .. 650 milliGRAM(s) Oral every 6 hours PRN Mild Pain (1 - 3), Moderate Pain (4 - 6)  albuterol    90 MICROgram(s) HFA Inhaler 2 Puff(s) Inhalation every 6 hours PRN Shortness of Breath and/or Wheezing  LORazepam     Tablet 2 milliGRAM(s) Oral every 6 hours PRN agitation  LORazepam   Injectable 2 milliGRAM(s) IntraMuscular once PRN severe agitation  melatonin. 5 milliGRAM(s) Oral at bedtime PRN Insomnia

## 2025-02-24 NOTE — BH SOCIAL WORK INITIAL PSYCHOSOCIAL EVALUATION - OTHER PAST PSYCHIATRIC HISTORY (INCLUDE DETAILS REGARDING ONSET, COURSE OF ILLNESS, INPATIENT/OUTPATIENT TREATMENT)
Patient is a 34 year old female who lives alone and works as a . Her collateral contact is listed as her mother, Amanda, [phone]. The patient has no previous inpatient psychiatric hospitalizations, but has a history of Anxiety, Depression, and PTSD. She has no reported history of trauma, substance abuse, suicidality/homicidality, SIB, aggression or legal issues. She was in treatment at  after her father . She was in therapy for about ten years ending in May, 2024. She is currently not in outpatient treatment. She has been increasingly anxious with no known trigger. She has had poor appetite, mood, sleep, and interest. She has lost 14 pounds, unintentionally. The patient went to the Crisis Center in September and 2024. The patient has been struggling to leave her house and reports significant social anxiety. She is motivated fort treatment. She, initially, agreed to a trial of medication, but then declined. She appeared anxious, calm and cooperative, but was oddly related and has, reportedly, been seen on the unit responding to internal stimuli. Patient would likely benefit from comprehensive outpatient treatment when she is stable. She does not have current providers. The patient has Medicaid, #[id number].

## 2025-02-25 PROCEDURE — 99232 SBSQ HOSP IP/OBS MODERATE 35: CPT

## 2025-02-25 RX ORDER — LORAZEPAM 4 MG/ML
2 VIAL (ML) INJECTION EVERY 6 HOURS
Refills: 0 | Status: DISCONTINUED | OUTPATIENT
Start: 2025-02-25 | End: 2025-03-03

## 2025-02-25 RX ORDER — LORAZEPAM 4 MG/ML
2 VIAL (ML) INJECTION ONCE
Refills: 0 | Status: DISCONTINUED | OUTPATIENT
Start: 2025-02-25 | End: 2025-03-03

## 2025-02-25 NOTE — BH CHART NOTE - NSEVENTNOTEFT_PSY_ALL_CORE
Seen at primary team request for purposes of PC.  Chart reviewed.  Pt is a 35 year old woman, pphx of BRYAN, admitted with worsening anxiety, but also abnormal thought content and disorganized behavior (not eating or drinking at home).  Pt on unit is noted to be guarded, oddly related, and internally preoccupied.  On interview, pt states her anxiety is better, when asked about not eating and drinking at home and staff's concerns that this is continuing on unit, pt denies this.  Pt denies hearing any voices.  When asked about her treatment, pt states she has only spoken to a therapist and  (though as per chart, pt has been refusing psychotherapy on unit).      Concern remains for ongoing psychosis.  Pt has been refusing medications and vitals.  Primary team is seeking court order for treatment over objection.  Converting to 9.27 for this purpose.  Pt remains acute risk of harm due to inability to safely care for self.  PC completed and in chart.

## 2025-02-26 PROCEDURE — 99232 SBSQ HOSP IP/OBS MODERATE 35: CPT

## 2025-02-26 RX ORDER — RISPERIDONE 4 MG
0.5 TABLET ORAL AT BEDTIME
Refills: 0 | Status: DISCONTINUED | OUTPATIENT
Start: 2025-02-26 | End: 2025-03-04

## 2025-02-26 NOTE — BH CHART NOTE - NSEVENTNOTEFT_PSY_ALL_CORE
DIRECTOR OF INPATIENT PSYCHIATRY NOTE:  I have evaluated the patient’s need for medication over her objection in the presence of the LS  Mr. Rigoberto Johnson, the risks and benefits of medication, and her ability to make a reasoned decision.      Briefly, the pt is a 35 yo female with BRYAN, no prior hospitalizations, no past SA, no hx of violence, medical hx of HTN, recurring kidney stones, asthma, admitted voluntarily from [facility] in for treatment of worsening anxiety associated with psychosocial stressors.       On evaluation, the   She has been prescribed Risperdal        She has not been taking medications as prescribed. She does not understand the extent of her illness.    It is my opinion that this patient currently experiences psychotic symptoms that are severely impacting her safety and ability to care for herself, and would likely benefit from antipsychotic medications.  Furthermore, it is my opinion that she lacks capacity to refuse antipsychotic therapy.  I have informed the patient of my decision and that unless she withdraws her objection to taking medications, we will make application to court for authorization to treat her over her objection. I have notified the patient and [facility] of this decision by letter.   DIRECTOR OF INPATIENT PSYCHIATRY NOTE:  I have evaluated the patient’s need for medication over her objection in the presence of the LS  Mr. Rigoberto Johnson, the risks and benefits of medication, and her ability to make a reasoned decision.      Briefly, the pt is a 33 yo female with BRYAN, no prior hospitalizations, no past SA, no hx of violence, medical hx of HTN, recurring kidney stones, asthma, admitted voluntarily from [facility] in for treatment of worsening anxiety associated with psychosocial stressors.       On evaluation, the patient was found sitting on her bed with holding a banana peel, staring into the space. however, when spoken to, she was engaged.  Stated that she had taken Prozac in the past and wanted to try Lexapro. She does not feel she needs Risperdal because it is strong and she is very sensitive. She denies having hallucinatory experiences, denies paranoia, denies suicidal/homicidal ideation.  She has been prescribed Risperdal        She has not been taking medications as prescribed. She does not understand the extent of her illness.    It is my opinion that this patient currently experiences psychotic symptoms that are severely impacting her safety and ability to care for herself, and would likely benefit from antipsychotic medications.  Furthermore, it is my opinion that she lacks capacity to refuse antipsychotic therapy.  I have informed the patient of my decision and that unless she withdraws her objection to taking medications, we will make application to court for authorization to treat her over her objection. I have notified the patient and LS of this decision by letter.

## 2025-02-27 PROCEDURE — 99232 SBSQ HOSP IP/OBS MODERATE 35: CPT

## 2025-02-27 NOTE — BH TREATMENT PLAN - NSTXPATIENTPARTICIPATE_PSY_ALL_CORE
Patient participated in defining interventions
No, patient unwilling to participate
Patient participated in identification of needs/problems/goals for treatment

## 2025-02-27 NOTE — BH TREATMENT PLAN - NSTXANXGOAL_PSY_ALL_CORE
Be able to participate in activities despite lingering anxiety/panic
Identify and practice 3 coping skills to manage anxiety
Identify and practice 3 coping skills to manage anxiety

## 2025-02-27 NOTE — BH TREATMENT PLAN - ANXIETY/PANIC/FEAR NURSING INTERVENTIONS/RECOMMENDATIONS
RN will assist patient in developing coping skills to help decrease anxiety
Pt will develop effective coping mechanisms

## 2025-02-27 NOTE — BH TREATMENT PLAN - NSTXMEDICINTERPR_PSY_ALL_CORE
Psychiatric rehabilitation staff recommends patient in individual and group therapy sessions in order for patient to gain psychoeducation thereto and support over the next seven days. Psychiatric rehabilitation staff will continuously work with patient to build therapeutic rapport.
Psychiatric rehabilitation staff recommends patient in individual and group therapy sessions in order for patient to gain psychoeducation thereto and support over the next seven days. Psychiatric rehabilitation staff will continuously work with patient to build therapeutic rapport.

## 2025-02-27 NOTE — BH TREATMENT PLAN - NSPTSTATEDGOAL_PSY_ALL_CORE
reduced anxiety
Patient is motivated for treatment and wants to get well, per EMR.
Patient is motivated for treatment and wants to get well, per EMR.

## 2025-02-27 NOTE — BH TREATMENT PLAN - NSBHPRIMARYDX_PSY_ALL_CORE
BRYAN (generalized anxiety disorder)    
Psychosis, unspecified psychosis type    
Psychosis, unspecified psychosis type

## 2025-02-28 PROCEDURE — 99232 SBSQ HOSP IP/OBS MODERATE 35: CPT

## 2025-02-28 RX ADMIN — Medication 0.5 MILLIGRAM(S): at 20:53

## 2025-03-01 RX ADMIN — Medication 0.5 MILLIGRAM(S): at 20:28

## 2025-03-03 PROCEDURE — 99232 SBSQ HOSP IP/OBS MODERATE 35: CPT

## 2025-03-03 RX ORDER — LORAZEPAM 4 MG/ML
2 VIAL (ML) INJECTION ONCE
Refills: 0 | Status: DISCONTINUED | OUTPATIENT
Start: 2025-03-03 | End: 2025-03-06

## 2025-03-03 RX ORDER — LORAZEPAM 4 MG/ML
2 VIAL (ML) INJECTION EVERY 6 HOURS
Refills: 0 | Status: DISCONTINUED | OUTPATIENT
Start: 2025-03-03 | End: 2025-03-06

## 2025-03-03 RX ADMIN — Medication 0.5 MILLIGRAM(S): at 21:19

## 2025-03-04 PROCEDURE — 99232 SBSQ HOSP IP/OBS MODERATE 35: CPT

## 2025-03-04 RX ORDER — RISPERIDONE 4 MG
1 TABLET ORAL AT BEDTIME
Refills: 0 | Status: DISCONTINUED | OUTPATIENT
Start: 2025-03-04 | End: 2025-03-06

## 2025-03-04 RX ADMIN — Medication 1 MILLIGRAM(S): at 21:13

## 2025-03-05 PROCEDURE — 99238 HOSP IP/OBS DSCHRG MGMT 30/<: CPT

## 2025-03-05 RX ORDER — RISPERIDONE 4 MG
1 TABLET ORAL
Qty: 14 | Refills: 0
Start: 2025-03-05 | End: 2025-03-18

## 2025-03-05 NOTE — BH INPATIENT PSYCHIATRY PROGRESS NOTE - NSICDXBHPRIMARYDX_PSY_ALL_CORE
Psychosis, unspecified psychosis type   F29  
BRYAN (generalized anxiety disorder)   F41.1  
Psychosis, unspecified psychosis type   F29  
BRYAN (generalized anxiety disorder)   F41.1  
Psychosis, unspecified psychosis type   F29  

## 2025-03-05 NOTE — BH INPATIENT PSYCHIATRY PROGRESS NOTE - NSTXMEDICDATETRGT_PSY_ALL_CORE
01-Mar-2025
08-Mar-2025
01-Mar-2025
08-Mar-2025
01-Mar-2025
01-Mar-2025
08-Mar-2025
01-Mar-2025

## 2025-03-05 NOTE — BH DISCHARGE NOTE NURSING/SOCIAL WORK/PSYCH REHAB - NSDCPRGOAL_PSY_ALL_CORE
The patient met her specified goal to take all medications as prescribed. Progress was evidenced by taking all medications. The patient attended none of the Psychiatric Rehabilitation groups throughout her stay. On unit, patient was appropriately groomed and dressed. Patient was mostly found in room staring into the hallway or in dining room. Patient was unable to complete her safety plan prior to discharge after writer made multiple attempts which patient refused.

## 2025-03-05 NOTE — BH INPATIENT PSYCHIATRY PROGRESS NOTE - NSBHATTESTBILLING_PSY_A_CORE
68928-Bkvvwqpsvp OBS or IP - moderate complexity OR 35-49 mins
65290-Ttqafrtmut OBS or IP - moderate complexity OR 35-49 mins
94485-Ffzehbrczv OBS or IP - moderate complexity OR 35-49 mins
39373-Jntczpagiw OBS or IP - moderate complexity OR 35-49 mins
89537-Nxaqdatkzo OBS or IP - moderate complexity OR 35-49 mins
24999-Xxatpbobqn OBS or IP - moderate complexity OR 35-49 mins
37386-Gdrbobbomm OBS or IP - moderate complexity OR 35-49 mins
19422-Wqtiutaofg OBS or IP - moderate complexity OR 35-49 mins
85991-Azeaxukkqo OBS or IP - moderate complexity OR 35-49 mins
66277-Hxagtrdede OBS or IP - moderate complexity OR 35-49 mins

## 2025-03-05 NOTE — BH INPATIENT PSYCHIATRY DISCHARGE NOTE - MSE UNSTRUCTURED FT
Appearance: Dressed appropriately.  Attitude / Behavior / relatedness: Mannerisms, gestures, ability to follow commands/requests, compulsions. Cooperative, hostile, open, secretive, evasive, suspicious, apathetic, easily distracted, focused, defensive, Demanding.   Eye contact: limited   Motor: No abnormal movements, no psychomotor slowing or activation.  Speech: Regular rate.  Mood: *nods suggesting fine   Affect: neutral   Thought Process: concrete   Thought Content: impoverished   Perceptual: possibly internally preoccupied   Insight: limited   Judgment: fair   Impulse control: good     Cognition: grossly intact  Gait: Intact.

## 2025-03-05 NOTE — BH DISCHARGE NOTE NURSING/SOCIAL WORK/PSYCH REHAB - NSBHDCAGENCY1FT_PSY_A_CORE
Camelia Gandara, Psychiatrist with [facility] ([facility])

## 2025-03-05 NOTE — BH INPATIENT PSYCHIATRY PROGRESS NOTE - MSE UNSTRUCTURED FT
Appearance: Dressed appropriately.  Attitude / Behavior / relatedness: Oddly related, internally preoccupied, standing while staring quietly in dining area.  Eye contact: Minimal.  Motor: Psychomotor slowed.  Speech: Laconic, soft volume.  Mood: Ok.  Affect: Anxious.  Thought Process: Bradyphrenic.  Associations: Limited.   Thought Content: Poverty of TC.  Denies AVH, SIIP, HIIP.  Insight: limited   Judgment: limited   Impulse control: fair    Cognition: Unable to fully assess.  Gait: Intact. 
Appearance: Dressed appropriately.  Attitude / Behavior / relatedness: Oddly related, internally preoccupied, standing in her room light off staring at wall   Eye contact: Minimal.  Motor: Psychomotor slowed.  Speech: Laconic, soft volume.  Mood: Ok.  Affect: Anxious.  Thought Process: with blocking   Associations: Limited.   Thought Content: Poverty of TC.  Denies AVH, SIIP, HIIP.  Insight: limited   Judgment: limited   Impulse control: fair    Cognition: grossly intact   Gait: Intact. 
Appearance: Dressed appropriately.  Attitude / Behavior / relatedness: oddly-related. irritable; distracted   Eye contact: fair   Motor: No abnormal movements, no psychomotor slowing or activation.  Speech: Regular rate.  Mood: "fine"  Affect: anxious, restricted   Thought Process: with thought blocking   Thought Content: devoid of SI and HI. +preoccupations; + paranoia. denies avh though appears IP   Insight: limited   Judgment: limited   Impulse control: fair    Cognition: grossly intact  Gait: Intact. 
Appearance: Dressed appropriately.  Attitude / Behavior / relatedness: oddly-related. calm   Eye contact: fair   Motor: No abnormal movements, no psychomotor slowing or activation.  Speech: Regular rate.  Mood: "fine"  Affect: anxious, restricted   Thought Process: with thought blocking   Thought Content: devoid of SI and HI. +preoccupations; + paranoia. denies avh though appears IP   Insight: limited   Judgment: limited   Impulse control: fair    Cognition: grossly intact  Gait: Intact. 
Appearance: Dressed appropriately.  Attitude / Behavior / relatedness: Oddly related, minimally engaged  Eye contact: fair  Motor: Psychomotor slowed.  Speech: Laconic, soft volume.  Mood: Ok.  Affect: Anxious.  Thought Process: with blocking   Associations: Limited.   Thought Content: Poverty of TC.  Denies AVH, SIIP, HIIP.  Insight: limited   Judgment: limited / improving in that she is becoming more compliant to medications   Impulse control: has been intact through hospitalization has not required PRNs   Cognition: grossly intact   Gait: Intact. 
Appearance: Dressed appropriately.  Attitude / Behavior / relatedness: Oddly related, minimally engaged  Eye contact: fair  Motor: Psychomotor slowed.  Speech: Laconic, soft volume.  Mood: Ok.  Affect: Anxious.  Thought Process: with blocking   Associations: Limited.   Thought Content: Poverty of TC.  Denies AVH, SIIP, HIIP.  Insight: limited   Judgment: limited / improving in that she is becoming more compliant to medications   Impulse control: has been intact through hospitalization has not required PRNs   Cognition: grossly intact   Gait: Intact. 
Appearance: Dressed appropriately.  Attitude / Behavior / relatedness: Oddly related, internally preoccupied, standing while staring quietly at wall in room.  Eye contact: Minimal.  Motor: Psychomotor slowed.  Speech: Does not speak.   Mood: Does not give mood.  Affect: Anxious.  Thought Process: Appears thought blocked.   Thought Content: Poverty of TC.  Denies AVH< SIIP, HIIP.  Insight: limited   Judgment: limited   Impulse control: fair    Cognition: Unable to fully assess.  Gait: Intact. 
Appearance: Dressed appropriately.  Attitude / Behavior / relatedness: oddly-related. irritable; distracted   Eye contact: evasive   Motor: No abnormal movements, no psychomotor slowing or activation.  Speech: Regular rate.  Mood: "fine"  Affect: anxious, restricted   Thought Process: with thought blocking   Thought Content: devoid of SI and HI. +preoccupations; + paranoia. denies avh though appears IP   Insight: limited   Judgment: limited   Impulse control: fair    Cognition: grossly intact  Gait: Intact.
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
## 2025-03-05 NOTE — BH INPATIENT PSYCHIATRY PROGRESS NOTE - NSBHATTESTTYPEVISIT_PSY_A_CORE
Attending Only
RICARDO without on-site Attending supervision
Attending Only
RICARDO without on-site Attending supervision

## 2025-03-05 NOTE — BH INPATIENT PSYCHIATRY DISCHARGE NOTE - OTHER PAST PSYCHIATRIC HISTORY (INCLUDE DETAILS REGARDING ONSET, COURSE OF ILLNESS, INPATIENT/OUTPATIENT TREATMENT)
Patient is a 34 year old female who lives alone and works as a . Her collateral contact is listed as her mother, Amanda, [phone]. The patient has no previous inpatient psychiatric hospitalizations, but has a history of Anxiety, Depression, and PTSD. She has no reported history of trauma, substance abuse, suicidality/homicidality, SIB, aggression or legal issues. She was in treatment at  after her father . She was in therapy for about ten years ending in May, 2024. She is currently not in outpatient treatment. She has been increasingly anxious with no known trigger. She has had poor appetite, mood, sleep, and interest. She has lost 14 pounds, unintentionally. The patient went to the Crisis Center in September and 2024. The patient has been struggling to leave her house and reports significant social anxiety. She is motivated fort treatment. She, initially, agreed to a trial of medication, but then declined. She appeared anxious, calm and cooperative, but was oddly related and has, reportedly, been seen on the unit responding to internal stimuli. Patient would likely benefit from comprehensive outpatient treatment when she is stable. She does not have current providers. The patient has Medicaid, #[id number].

## 2025-03-05 NOTE — BH INPATIENT PSYCHIATRY DISCHARGE NOTE - NSBHMETABOLIC_PSY_ALL_CORE_FT
BMI: BMI (kg/m2): 29.1 (02-21-25 @ 16:21)  HbA1c:   Glucose:   BP: --Vital Signs Last 24 Hrs  T(C): --  T(F): --  HR: --  BP: --  BP(mean): --  RR: --  SpO2: --      Lipid Panel:

## 2025-03-05 NOTE — BH INPATIENT PSYCHIATRY PROGRESS NOTE - MSE OPTIONS
Unstructured MSE
Structured MSE
Unstructured MSE
Structured MSE

## 2025-03-05 NOTE — BH INPATIENT PSYCHIATRY PROGRESS NOTE - NSBHASSESSSUMMFT_PSY_ALL_CORE
33 yo female with BRYAN, no prior hospitalizations, no past SA, no hx of violence, medical hx of HTN, recurring kidney stones, asthma, admitted voluntarily from [facility] walk in for treatment of worsening anxiety associated with psychosocial stressors.     pt continues to demonstrate psychotic sx; she is now taking Risperdal. no acute agitation / expression of self-harm and patient's behavior has generally been benign through hospital course. patient's mom strongly advocating for discharge stating this setting is counterproductive to patient's improvements     Plan:   Standard OBS   Increase Risperdal to 1mg qhs  Lorazepam PRN for agitation   IGM therapy   Coordinate with s/w and rest of team  
35 yo female with BRYAN, no prior hospitalizations, no past SA, no hx of violence, medical hx of HTN, recurring kidney stones, asthma, admitted voluntarily from [facility] walk in for treatment of worsening anxiety associated with psychosocial stressors.     Continues to exhibit psychosis.    Plan:   Standard OBS   start Risperdal 0.5mg qhs.  Team applying for treatment over objection  Lorazepam PRN for agitation   IGM therapy   Coordinate with s/w and rest of team  
33 yo female with BRYAN, no prior hospitalizations, no past SA, no hx of violence, medical hx of HTN, recurring kidney stones, asthma, admitted voluntarily from [facility] walk in for treatment of worsening anxiety associated with psychosocial stressors.     pt oddly-related, thought blocked, guarded. there may be an underlying psychotic component. pt was offered Risperdal over the weekend and declined     Plan:   Standard OBS   Pt declines psychopharm meds at this time   Lorazepam PRN for agitation   IGM therapy   Coordinate with s/w and rest of team  
33 yo female with BRYAN, no prior hospitalizations, no past SA, no hx of violence, medical hx of HTN, recurring kidney stones, asthma, admitted voluntarily from [facility] walk in for treatment of worsening anxiety associated with psychosocial stressors.   Patients presentation meets criteria for BRYAN with functional impairments such that inpatient stabilization will be beneficial. Hospitalization will aid with symptom stabilization, treatment optimization, coordination of outpatient services. no CO indicated at this time. Pt was offered Lexapro but she ultimately declines. Will explore further.     On assessment, patient remains internally preoccupied and oddly related. She refused Risperdal last night.    Plan:   Standard OBS   Pt declines psychopharm meds at this time   Lorazepam PRN for agitation   IGM therapy   Coordinate with s/w and rest of team  
35 yo female with BRYAN, no prior hospitalizations, no past SA, no hx of violence, medical hx of HTN, recurring kidney stones, asthma, admitted voluntarily from [facility] walk in for treatment of worsening anxiety associated with psychosocial stressors.   Patients presentation meets criteria for BRYAN with functional impairments such that inpatient stabilization will be beneficial. Hospitalization will aid with symptom stabilization, treatment optimization, coordination of outpatient services. no CO indicated at this time. Pt was offered Lexapro but she ultimately declines. Will explore further.     On assessment, patient presents with paranoia and appears internally preoccupied. She avoids eye contact and would not acknowledge her name. Refused to engage in interview. Risperdal 0.5mg HS ordered.    Plan:   Standard OBS   Pt declines psychopharm meds at this time   Lorazepam PRN for agitation   IGM therapy   Coordinate with s/w and rest of team  
35 yo female with BRYAN, no prior hospitalizations, no past SA, no hx of violence, medical hx of HTN, recurring kidney stones, asthma, admitted voluntarily from [facility] walk in for treatment of worsening anxiety associated with psychosocial stressors.     Continues to exhibit psychosis.    Plan:   Standard OBS   start Risperdal 0.5mg qhs.  Team applying for treatment over objection  Lorazepam PRN for agitation   IGM therapy   Coordinate with s/w and rest of team  
33 yo female with BRYAN, no prior hospitalizations, no past SA, no hx of violence, medical hx of HTN, recurring kidney stones, asthma, admitted voluntarily from [facility] walk in for treatment of worsening anxiety associated with psychosocial stressors.     pt continues to demonstrate symptoms consistent with psychosis. pt seems open to taking medication though remains overall guarded and circular     Plan:   Standard OBS   start Risperdal 0.5mg qhs.  Team applying for treatment over objection  Lorazepam PRN for agitation   IGM therapy   Coordinate with s/w and rest of team  
33 yo female with BRYAN, no prior hospitalizations, no past SA, no hx of violence, medical hx of HTN, recurring kidney stones, asthma, admitted voluntarily from [facility] walk in for treatment of worsening anxiety associated with psychosocial stressors.     pt is guarded, seems to be minimizing, evasive. there appears to be a psychotically driven component to her presentation. pt lacks capacity to decline treatment     Plan:   Standard OBS   start Risperdal 0.5mg qhs  Lorazepam PRN for agitation   IGM therapy   Coordinate with s/w and rest of team  
35 yo female with BRYAN, no prior hospitalizations, no past SA, no hx of violence, medical hx of HTN, recurring kidney stones, asthma, admitted voluntarily from [facility] walk in for treatment of worsening anxiety associated with psychosocial stressors.     pt continues to experience symptoms suggestive of psychosis. she has been refusing vitals, labs, medication. this is further corroborated by collateral from her mother. Patient lacks capacity to decline treatment at this time     Plan:   Standard OBS   Pt declines psychopharm meds at this time   Lorazepam PRN for agitation   IGM therapy   Coordinate with s/w and rest of team  
35 yo female with BRYAN, no prior hospitalizations, no past SA, no hx of violence, medical hx of HTN, recurring kidney stones, asthma, admitted voluntarily from [facility] walk in for treatment of worsening anxiety associated with psychosocial stressors.     continues to be thought blocked > catatonia given above presentation. Mom vehemently requesting for discharge. patient hasn't done anything dangerous here and is now compliant with medication.    Plan:   Standard OBS   c/w Risperdal to 1mg qhs  Lorazepam PRN for agitation   IGM therapy   Coordinate with s/w and rest of team

## 2025-03-05 NOTE — BH INPATIENT PSYCHIATRY DISCHARGE NOTE - NSBHDCHANDOFFFT_PSY_ALL_CORE
handoff and dc summary provided to dr. Elizabeth Gandara at [facility]. inpatient team can be contacted at [phone]

## 2025-03-05 NOTE — BH INPATIENT PSYCHIATRY PROGRESS NOTE - NSTXDCOPLKDATENEW_PSY_ALL_CORE
03-Mar-2025
Please send this request and all other refill request to [facility] [place].    Kindly,   SCOT Ramos CNP      
03-Mar-2025

## 2025-03-05 NOTE — BH INPATIENT PSYCHIATRY PROGRESS NOTE - NSBHCHARTREVIEWVS_PSY_A_CORE FT
Vital Signs Last 24 Hrs  T(C): --  T(F): --  HR: --  BP: --  BP(mean): --  RR: --  SpO2: --    
Vital Signs Last 24 Hrs  T(C): --  T(F): --  HR: --  BP: --  BP(mean): --  RR: --  SpO2: --    
Vital Signs Last 24 Hrs  T(C): 36.9 (02-24-25 @ 21:00), Max: 36.9 (02-24-25 @ 21:00)  T(F): 98.5 (02-24-25 @ 21:00), Max: 98.5 (02-24-25 @ 21:00)  HR: --  BP: --  BP(mean): --  RR: --  SpO2: --    Orthostatic VS  02-24-25 @ 21:00  Lying BP: --/-- HR: --  Sitting BP: 117/78 HR: 89  Standing BP: 123/85 HR: 91  Site: --  Mode: --  Orthostatic VS  02-24-25 @ 08:02  Lying BP: --/-- HR: --  Sitting BP: 127/90 HR: 87  Standing BP: 127/89 HR: 89  Site: --  Mode: --  Orthostatic VS  02-23-25 @ 20:53  Lying BP: --/-- HR: --  Sitting BP: 108/96 HR: 96  Standing BP: 115/91 HR: 112  Site: --  Mode: --  
Vital Signs Last 24 Hrs  T(C): --  T(F): --  HR: --  BP: --  BP(mean): --  RR: --  SpO2: --    
Vital Signs Last 24 Hrs  T(C): --  T(F): --  HR: --  BP: --  BP(mean): --  RR: --  SpO2: --    
Vital Signs Last 24 Hrs  T(C): --  T(F): --  HR: --  BP: --  BP(mean): --  RR: --  SpO2: --    Orthostatic VS  02-24-25 @ 21:00  Lying BP: --/-- HR: --  Sitting BP: 117/78 HR: 89  Standing BP: 123/85 HR: 91  Site: --  Mode: --  
Vital Signs Last 24 Hrs  T(C): 36.6 (02-24-25 @ 08:02), Max: 37 (02-23-25 @ 20:53)  T(F): 97.8 (02-24-25 @ 08:02), Max: 98.6 (02-23-25 @ 20:53)  HR: --  BP: --  BP(mean): --  RR: --  SpO2: --    Orthostatic VS  02-24-25 @ 08:02  Lying BP: --/-- HR: --  Sitting BP: 127/90 HR: 87  Standing BP: 127/89 HR: 89  Site: --  Mode: --  Orthostatic VS  02-23-25 @ 20:53  Lying BP: --/-- HR: --  Sitting BP: 108/96 HR: 96  Standing BP: 115/91 HR: 112  Site: --  Mode: --  
Vital Signs Last 24 Hrs  T(C): --  T(F): --  HR: --  BP: --  BP(mean): --  RR: --  SpO2: --    
Vital Signs Last 24 Hrs  T(C): --  T(F): --  HR: --  BP: --  BP(mean): --  RR: --  SpO2: --    Orthostatic VS  02-21-25 @ 16:21  Lying BP: --/-- HR: --  Sitting BP: 122/90 HR: 91  Standing BP: 116/83 HR: 120  Site: --  Mode: --  
Vital Signs Last 24 Hrs  T(C): 37 (02-21-25 @ 16:21), Max: 37 (02-21-25 @ 16:21)  T(F): 98.6 (02-21-25 @ 16:21), Max: 98.6 (02-21-25 @ 16:21)  HR: --  BP: --  BP(mean): --  RR: --  SpO2: --    Orthostatic VS  02-21-25 @ 16:21  Lying BP: --/-- HR: --  Sitting BP: 122/90 HR: 91  Standing BP: 116/83 HR: 120  Site: --  Mode: --
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
## 2025-03-05 NOTE — BH INPATIENT PSYCHIATRY PROGRESS NOTE - NSBHMETABOLIC_PSY_ALL_CORE_FT
BMI: BMI (kg/m2): 29.1 (02-21-25 @ 16:21)  HbA1c:   Glucose:   BP: --Vital Signs Last 24 Hrs  T(C): --  T(F): --  HR: --  BP: --  BP(mean): --  RR: --  SpO2: --      Lipid Panel: 
BMI: BMI (kg/m2): 29.1 (02-21-25 @ 16:21)  HbA1c:   Glucose:   BP: --Vital Signs Last 24 Hrs  T(C): 37 (02-21-25 @ 16:21), Max: 37 (02-21-25 @ 16:21)  T(F): 98.6 (02-21-25 @ 16:21), Max: 98.6 (02-21-25 @ 16:21)  HR: --  BP: --  BP(mean): --  RR: --  SpO2: --    Orthostatic VS  02-21-25 @ 16:21  Lying BP: --/-- HR: --  Sitting BP: 122/90 HR: 91  Standing BP: 116/83 HR: 120  Site: --  Mode: --    Lipid Panel: 
BMI: BMI (kg/m2): 29.1 (02-21-25 @ 16:21)  HbA1c:   Glucose:   BP: --Vital Signs Last 24 Hrs  T(C): --  T(F): --  HR: --  BP: --  BP(mean): --  RR: --  SpO2: --      Lipid Panel: 
BMI: BMI (kg/m2): 29.1 (02-21-25 @ 16:21)  HbA1c:   Glucose:   BP: --Vital Signs Last 24 Hrs  T(C): --  T(F): --  HR: --  BP: --  BP(mean): --  RR: --  SpO2: --    Orthostatic VS  02-24-25 @ 21:00  Lying BP: --/-- HR: --  Sitting BP: 117/78 HR: 89  Standing BP: 123/85 HR: 91  Site: --  Mode: --    Lipid Panel: 
BMI: BMI (kg/m2): 29.1 (02-21-25 @ 16:21)  HbA1c:   Glucose:   BP: --Vital Signs Last 24 Hrs  T(C): --  T(F): --  HR: --  BP: --  BP(mean): --  RR: --  SpO2: --      Lipid Panel: 
BMI: BMI (kg/m2): 29.1 (02-21-25 @ 16:21)  HbA1c:   Glucose:   BP: --Vital Signs Last 24 Hrs  T(C): --  T(F): --  HR: --  BP: --  BP(mean): --  RR: --  SpO2: --    Orthostatic VS  02-21-25 @ 16:21  Lying BP: --/-- HR: --  Sitting BP: 122/90 HR: 91  Standing BP: 116/83 HR: 120  Site: --  Mode: --    Lipid Panel: 
BMI: BMI (kg/m2): 29.1 (02-21-25 @ 16:21)  HbA1c:   Glucose:   BP: --Vital Signs Last 24 Hrs  T(C): 36.9 (02-24-25 @ 21:00), Max: 36.9 (02-24-25 @ 21:00)  T(F): 98.5 (02-24-25 @ 21:00), Max: 98.5 (02-24-25 @ 21:00)  HR: --  BP: --  BP(mean): --  RR: --  SpO2: --    Orthostatic VS  02-24-25 @ 21:00  Lying BP: --/-- HR: --  Sitting BP: 117/78 HR: 89  Standing BP: 123/85 HR: 91  Site: --  Mode: --  Orthostatic VS  02-24-25 @ 08:02  Lying BP: --/-- HR: --  Sitting BP: 127/90 HR: 87  Standing BP: 127/89 HR: 89  Site: --  Mode: --  Orthostatic VS  02-23-25 @ 20:53  Lying BP: --/-- HR: --  Sitting BP: 108/96 HR: 96  Standing BP: 115/91 HR: 112  Site: --  Mode: --    Lipid Panel: 
BMI: BMI (kg/m2): 29.1 (02-21-25 @ 16:21)  HbA1c:   Glucose:   BP: --Vital Signs Last 24 Hrs  T(C): 36.6 (02-24-25 @ 08:02), Max: 37 (02-23-25 @ 20:53)  T(F): 97.8 (02-24-25 @ 08:02), Max: 98.6 (02-23-25 @ 20:53)  HR: --  BP: --  BP(mean): --  RR: --  SpO2: --    Orthostatic VS  02-24-25 @ 08:02  Lying BP: --/-- HR: --  Sitting BP: 127/90 HR: 87  Standing BP: 127/89 HR: 89  Site: --  Mode: --  Orthostatic VS  02-23-25 @ 20:53  Lying BP: --/-- HR: --  Sitting BP: 108/96 HR: 96  Standing BP: 115/91 HR: 112  Site: --  Mode: --    Lipid Panel:

## 2025-03-05 NOTE — BH DISCHARGE NOTE NURSING/SOCIAL WORK/PSYCH REHAB - PATIENT PORTAL LINK FT
You can access the FollowMyHealth Patient Portal offered by [facility] by registering at the following website: [email or web address]. By joining [facility]’s FollowMyHealth portal, you will also be able to view your health information using other applications (apps) compatible with our system.

## 2025-03-05 NOTE — BH INPATIENT PSYCHIATRY PROGRESS NOTE - NSICDXBHSECONDARYDX_PSY_ALL_CORE
Asthma   J45.909  BRYAN (generalized anxiety disorder)   F41.1  
Asthma   J45.909  Psychosis   F29  
Asthma   J45.909  BRYAN (generalized anxiety disorder)   F41.1  
Asthma   J45.909  Psychosis   F29

## 2025-03-05 NOTE — BH INPATIENT PSYCHIATRY PROGRESS NOTE - NSBHFUPINTERVALCCFT_PSY_A_CORE
follow up psychosis 
follow up mood, ?psychosis 
follow up psychosis 
Patient seen for follow up for anxiety and psychosis.
Patient seen for follow up for anxiety and psychosis.

## 2025-03-05 NOTE — BH DISCHARGE NOTE NURSING/SOCIAL WORK/PSYCH REHAB - NSDCADDINFO1FT_PSY_ALL_CORE
Please note, this is an IN-PERSON appt. Thank you.  Please note, this is an Virtual appt. Thank you.

## 2025-03-05 NOTE — BH INPATIENT PSYCHIATRY PROGRESS NOTE - PRN MEDS
MEDICATIONS  (PRN):  acetaminophen     Tablet .. 650 milliGRAM(s) Oral every 6 hours PRN Mild Pain (1 - 3), Moderate Pain (4 - 6)  albuterol    90 MICROgram(s) HFA Inhaler 2 Puff(s) Inhalation every 6 hours PRN Shortness of Breath and/or Wheezing  LORazepam     Tablet 2 milliGRAM(s) Oral every 6 hours PRN agitation  LORazepam   Injectable 2 milliGRAM(s) IntraMuscular once PRN severe agitation  melatonin. 5 milliGRAM(s) Oral at bedtime PRN Insomnia  

## 2025-03-05 NOTE — BH INPATIENT PSYCHIATRY PROGRESS NOTE - NSTXMEDICDATEEST_PSY_ALL_CORE
22-Feb-2025

## 2025-03-05 NOTE — BH INPATIENT PSYCHIATRY PROGRESS NOTE - NSTXANXDATETRGT_PSY_ALL_CORE
28-Feb-2025
28-Feb-2025
06-Mar-2025
28-Feb-2025
28-Feb-2025
06-Mar-2025
28-Feb-2025
06-Mar-2025

## 2025-03-05 NOTE — BH INPATIENT PSYCHIATRY PROGRESS NOTE - NSTXANXDATEEST_PSY_ALL_CORE
21-Feb-2025

## 2025-03-05 NOTE — BH DISCHARGE NOTE NURSING/SOCIAL WORK/PSYCH REHAB - DISCHARGE INSTRUCTIONS AFTERCARE APPOINTMENTS
In order to check the location, date, or time of your aftercare appointment, please refer to your Discharge Instructions Document given to you upon leaving the hospital.  If you have lost the instructions please call [phone]

## 2025-03-05 NOTE — BH INPATIENT PSYCHIATRY DISCHARGE NOTE - NSDCCPCAREPLAN_GEN_ALL_CORE_FT
PRINCIPAL DISCHARGE DIAGNOSIS  Diagnosis: Psychosis, unspecified psychosis type  Assessment and Plan of Treatment: medications + offered therapy (pt declined)
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
## 2025-03-05 NOTE — BH INPATIENT PSYCHIATRY PROGRESS NOTE - NSTXANXINTERMD_PSY_ALL_CORE
medications + therapy
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
## 2025-03-05 NOTE — BH DISCHARGE NOTE NURSING/SOCIAL WORK/PSYCH REHAB - NSCDUDCCRISIS_PSY_A_CORE
[facility]  1 (408) [facility]-WELL ([phone])  Text "WELL" to 00995  Website: www.[employer]/.[employer] 1 (479) 621-MGOX (8226) Website: www.safehorizon.org/.National Suicide Prevention Lifeline 5 (357) 932-3495/.  [employer]  1 (971) LIFENET (562-0173)/.  [facility]  [address] [place] [address] [phone]   Hours:  Monday through Friday from 9 AM to 3 PM/988 Suicide and Crisis Lifeline

## 2025-03-05 NOTE — BH INPATIENT PSYCHIATRY DISCHARGE NOTE - REASON FOR ADMISSION
Patient presented seeking hospitalization for anxiety found to experience symptoms consistent with psychosis. unclear triggering factor[s]

## 2025-03-05 NOTE — BH INPATIENT PSYCHIATRY PROGRESS NOTE - CURRENT MEDICATION
MEDICATIONS  (STANDING):  risperiDONE  Disintegrating Tablet 1 milliGRAM(s) Oral at bedtime    MEDICATIONS  (PRN):  acetaminophen     Tablet .. 650 milliGRAM(s) Oral every 6 hours PRN Mild Pain (1 - 3), Moderate Pain (4 - 6)  albuterol    90 MICROgram(s) HFA Inhaler 2 Puff(s) Inhalation every 6 hours PRN Shortness of Breath and/or Wheezing  LORazepam     Tablet 2 milliGRAM(s) Oral every 6 hours PRN agitation  LORazepam   Injectable 2 milliGRAM(s) IntraMuscular once PRN severe agitation  melatonin. 5 milliGRAM(s) Oral at bedtime PRN Insomnia  
MEDICATIONS  (STANDING):  risperiDONE  Disintegrating Tablet 0.5 milliGRAM(s) Oral at bedtime    MEDICATIONS  (PRN):  acetaminophen     Tablet .. 650 milliGRAM(s) Oral every 6 hours PRN Mild Pain (1 - 3), Moderate Pain (4 - 6)  albuterol    90 MICROgram(s) HFA Inhaler 2 Puff(s) Inhalation every 6 hours PRN Shortness of Breath and/or Wheezing  LORazepam     Tablet 2 milliGRAM(s) Oral every 6 hours PRN agitation  LORazepam   Injectable 2 milliGRAM(s) IntraMuscular once PRN severe agitation  melatonin. 5 milliGRAM(s) Oral at bedtime PRN Insomnia  
MEDICATIONS  (STANDING):  risperiDONE  Disintegrating Tablet 1 milliGRAM(s) Oral at bedtime    MEDICATIONS  (PRN):  acetaminophen     Tablet .. 650 milliGRAM(s) Oral every 6 hours PRN Mild Pain (1 - 3), Moderate Pain (4 - 6)  albuterol    90 MICROgram(s) HFA Inhaler 2 Puff(s) Inhalation every 6 hours PRN Shortness of Breath and/or Wheezing  LORazepam     Tablet 2 milliGRAM(s) Oral every 6 hours PRN agitation  LORazepam   Injectable 2 milliGRAM(s) IntraMuscular once PRN severe agitation  melatonin. 5 milliGRAM(s) Oral at bedtime PRN Insomnia  
MEDICATIONS  (STANDING):  risperiDONE  Disintegrating Tablet 0.5 milliGRAM(s) Oral at bedtime    MEDICATIONS  (PRN):  acetaminophen     Tablet .. 650 milliGRAM(s) Oral every 6 hours PRN Mild Pain (1 - 3), Moderate Pain (4 - 6)  albuterol    90 MICROgram(s) HFA Inhaler 2 Puff(s) Inhalation every 6 hours PRN Shortness of Breath and/or Wheezing  LORazepam     Tablet 2 milliGRAM(s) Oral every 6 hours PRN agitation  LORazepam   Injectable 2 milliGRAM(s) IntraMuscular once PRN severe agitation  melatonin. 5 milliGRAM(s) Oral at bedtime PRN Insomnia  
MEDICATIONS  (STANDING):  risperiDONE   Tablet 0.5 milliGRAM(s) Oral at bedtime    MEDICATIONS  (PRN):  acetaminophen     Tablet .. 650 milliGRAM(s) Oral every 6 hours PRN Mild Pain (1 - 3), Moderate Pain (4 - 6)  albuterol    90 MICROgram(s) HFA Inhaler 2 Puff(s) Inhalation every 6 hours PRN Shortness of Breath and/or Wheezing  LORazepam     Tablet 2 milliGRAM(s) Oral every 6 hours PRN agitation  LORazepam   Injectable 2 milliGRAM(s) IntraMuscular once PRN severe agitation  melatonin. 5 milliGRAM(s) Oral at bedtime PRN Insomnia  
MEDICATIONS  (STANDING):  risperiDONE  Disintegrating Tablet 0.5 milliGRAM(s) Oral at bedtime    MEDICATIONS  (PRN):  acetaminophen     Tablet .. 650 milliGRAM(s) Oral every 6 hours PRN Mild Pain (1 - 3), Moderate Pain (4 - 6)  albuterol    90 MICROgram(s) HFA Inhaler 2 Puff(s) Inhalation every 6 hours PRN Shortness of Breath and/or Wheezing  LORazepam     Tablet 2 milliGRAM(s) Oral every 6 hours PRN agitation  LORazepam   Injectable 2 milliGRAM(s) IntraMuscular once PRN severe agitation  melatonin. 5 milliGRAM(s) Oral at bedtime PRN Insomnia  
MEDICATIONS  (STANDING):  risperiDONE   Tablet 0.5 milliGRAM(s) Oral at bedtime    MEDICATIONS  (PRN):  acetaminophen     Tablet .. 650 milliGRAM(s) Oral every 6 hours PRN Mild Pain (1 - 3), Moderate Pain (4 - 6)  albuterol    90 MICROgram(s) HFA Inhaler 2 Puff(s) Inhalation every 6 hours PRN Shortness of Breath and/or Wheezing  LORazepam     Tablet 2 milliGRAM(s) Oral every 6 hours PRN agitation  LORazepam   Injectable 2 milliGRAM(s) IntraMuscular once PRN severe agitation  melatonin. 5 milliGRAM(s) Oral at bedtime PRN Insomnia  
MEDICATIONS  (STANDING):    MEDICATIONS  (PRN):  acetaminophen     Tablet .. 650 milliGRAM(s) Oral every 6 hours PRN Mild Pain (1 - 3), Moderate Pain (4 - 6)  albuterol    90 MICROgram(s) HFA Inhaler 2 Puff(s) Inhalation every 6 hours PRN Shortness of Breath and/or Wheezing  LORazepam     Tablet 2 milliGRAM(s) Oral every 6 hours PRN agitation  LORazepam   Injectable 2 milliGRAM(s) IntraMuscular once PRN severe agitation  melatonin. 5 milliGRAM(s) Oral at bedtime PRN Insomnia  
MEDICATIONS  (STANDING):  risperiDONE   Tablet 0.5 milliGRAM(s) Oral at bedtime    MEDICATIONS  (PRN):  acetaminophen     Tablet .. 650 milliGRAM(s) Oral every 6 hours PRN Mild Pain (1 - 3), Moderate Pain (4 - 6)  albuterol    90 MICROgram(s) HFA Inhaler 2 Puff(s) Inhalation every 6 hours PRN Shortness of Breath and/or Wheezing  LORazepam     Tablet 2 milliGRAM(s) Oral every 6 hours PRN agitation  LORazepam   Injectable 2 milliGRAM(s) IntraMuscular once PRN severe agitation  melatonin. 5 milliGRAM(s) Oral at bedtime PRN Insomnia  
MEDICATIONS  (STANDING):  risperiDONE   Tablet 0.5 milliGRAM(s) Oral at bedtime    MEDICATIONS  (PRN):  acetaminophen     Tablet .. 650 milliGRAM(s) Oral every 6 hours PRN Mild Pain (1 - 3), Moderate Pain (4 - 6)  albuterol    90 MICROgram(s) HFA Inhaler 2 Puff(s) Inhalation every 6 hours PRN Shortness of Breath and/or Wheezing  LORazepam     Tablet 2 milliGRAM(s) Oral every 6 hours PRN agitation  LORazepam   Injectable 2 milliGRAM(s) IntraMuscular once PRN severe agitation  melatonin. 5 milliGRAM(s) Oral at bedtime PRN Insomnia

## 2025-03-19 PROCEDURE — 90836 PSYTX W PT W E/M 45 MIN: CPT

## 2025-03-19 PROCEDURE — 99214 OFFICE O/P EST MOD 30 MIN: CPT

## 2025-04-14 ENCOUNTER — INPATIENT (INPATIENT)
Facility: HOSPITAL | Age: 35
LOS: 31 days | Discharge: HOME | End: 2025-05-16
Attending: PSYCHIATRY & NEUROLOGY | Admitting: PSYCHIATRY & NEUROLOGY
Payer: MEDICAID

## 2025-04-14 VITALS — OXYGEN SATURATION: 100 % | TEMPERATURE: 98 F | WEIGHT: 172.18 LBS | HEIGHT: 67 IN

## 2025-04-14 DIAGNOSIS — F31.5 BIPOLAR DISORDER, CURRENT EPISODE DEPRESSED, SEVERE, WITH PSYCHOTIC FEATURES: ICD-10-CM

## 2025-04-14 LAB
FLUAV AG NPH QL: SIGNIFICANT CHANGE UP
FLUBV AG NPH QL: SIGNIFICANT CHANGE UP
RSV RNA NPH QL NAA+NON-PROBE: SIGNIFICANT CHANGE UP
SARS-COV-2 RNA SPEC QL NAA+PROBE: SIGNIFICANT CHANGE UP
SOURCE RESPIRATORY: SIGNIFICANT CHANGE UP

## 2025-04-14 PROCEDURE — 90833 PSYTX W PT W E/M 30 MIN: CPT | Mod: 95

## 2025-04-14 PROCEDURE — 99214 OFFICE O/P EST MOD 30 MIN: CPT | Mod: 95

## 2025-04-14 RX ORDER — HYDROXYZINE HYDROCHLORIDE 25 MG/1
50 TABLET, FILM COATED ORAL EVERY 8 HOURS
Refills: 0 | Status: DISCONTINUED | OUTPATIENT
Start: 2025-04-14 | End: 2025-05-16

## 2025-04-14 RX ORDER — RISPERIDONE 4 MG
1 TABLET ORAL AT BEDTIME
Refills: 0 | Status: DISCONTINUED | OUTPATIENT
Start: 2025-04-14 | End: 2025-04-24

## 2025-04-14 RX ORDER — MELATONIN 5 MG
3 TABLET ORAL AT BEDTIME
Refills: 0 | Status: DISCONTINUED | OUTPATIENT
Start: 2025-04-14 | End: 2025-05-16

## 2025-04-14 RX ORDER — ACETAMINOPHEN 500 MG/5ML
650 LIQUID (ML) ORAL EVERY 6 HOURS
Refills: 0 | Status: DISCONTINUED | OUTPATIENT
Start: 2025-04-14 | End: 2025-05-16

## 2025-04-14 RX ORDER — LORAZEPAM 4 MG/ML
2 VIAL (ML) INJECTION EVERY 6 HOURS
Refills: 0 | Status: DISCONTINUED | OUTPATIENT
Start: 2025-04-14 | End: 2025-04-21

## 2025-04-14 RX ORDER — LORAZEPAM 4 MG/ML
2 VIAL (ML) INJECTION ONCE
Refills: 0 | Status: DISCONTINUED | OUTPATIENT
Start: 2025-04-14 | End: 2025-04-21

## 2025-04-14 NOTE — BH INPATIENT PSYCHIATRY ASSESSMENT NOTE - MSE UNSTRUCTURED FT
Appearance: Dressed appropriately.  Attitude / Behavior / relatedness: oddly-related. hypervigilant   Eye contact: poor  Motor: No abnormal movements, no psychomotor slowing or activation.  Speech: Regular rate.  Mood: "fine"  Affect: guarded.   Thought Process: overall linear, at times evasive    Thought Content: denies SI and HI.   Perceptual: denies avh though she appears internally preoccupied   Insight: poor   Judgment: impaired  Impulse control:  intact  Cognition: grossly intact though not formally tested  Gait: Intact.

## 2025-04-14 NOTE — BH INPATIENT PSYCHIATRY ASSESSMENT NOTE - RISK ASSESSMENT
Risk factors for self-harm / harm to others: biological sex, psychiatric diagnosis  Protective factors: inpatient and in a controlled setting with limited access to lethal means. not endorsing harm to self or others, able to contract for safety.

## 2025-04-14 NOTE — BH INPATIENT PSYCHIATRY ASSESSMENT NOTE - DESCRIPTION
previously a social working. single, non-caregiver. previously living by herself but mom recently moved in to support her given psychotic regression

## 2025-04-14 NOTE — BH PATIENT PROFILE - FALL HARM RISK - UNIVERSAL INTERVENTIONS
Bed in lowest position, wheels locked, appropriate side rails in place/Call bell, personal items and telephone in reach/Instruct patient to call for assistance before getting out of bed or chair/Non-slip footwear when patient is out of bed/[place] to call system/Physically safe environment - no spills, clutter or unnecessary equipment/Purposeful Proactive Rounding/Room/bathroom lighting operational, light cord in reach

## 2025-04-14 NOTE — BH INPATIENT PSYCHIATRY ASSESSMENT NOTE - CURRENT MEDICATION
MEDICATIONS  (STANDING):  risperiDONE  Disintegrating Tablet 1 milliGRAM(s) Oral at bedtime    MEDICATIONS  (PRN):  acetaminophen     Tablet .. 650 milliGRAM(s) Oral every 6 hours PRN Mild Pain (1 - 3), Moderate Pain (4 - 6)  hydrOXYzine hydrochloride 50 milliGRAM(s) Oral every 8 hours PRN anxiety  LORazepam     Tablet 2 milliGRAM(s) Oral every 6 hours PRN agitation  LORazepam   Injectable 2 milliGRAM(s) IntraMuscular once PRN severe agitation  melatonin. 3 milliGRAM(s) Oral at bedtime PRN Insomnia

## 2025-04-14 NOTE — CHART NOTE - NSCHARTNOTEFT_GEN_A_CORE
Screening Medical Evaluation    Patient Admitted from: [facility] admitting diagnosis: Bipolar disorder, current episode depressed, severe, with psychotic features      PAST MEDICAL & SURGICAL HISTORY:  No known significant past medical history  No known significant past surgical history    ALLERGIES:  IV Contrast (Anaphylaxis)  Soy (Stomach Upset)    SOCIAL HISTORY:  Unknown    FAMILY HISTORY:  Unknown      MEDICATIONS  (STANDING):  risperiDONE  Disintegrating Tablet 1 milliGRAM(s) Oral at bedtime    MEDICATIONS  (PRN):  acetaminophen     Tablet .. 650 milliGRAM(s) Oral every 6 hours PRN Mild Pain (1 - 3), Moderate Pain (4 - 6)  hydrOXYzine hydrochloride 50 milliGRAM(s) Oral every 8 hours PRN anxiety  LORazepam     Tablet 2 milliGRAM(s) Oral every 6 hours PRN agitation  LORazepam   Injectable 2 milliGRAM(s) IntraMuscular once PRN severe agitation  melatonin. 3 milliGRAM(s) Oral at bedtime PRN Insomnia      Vital Signs Last 24 Hrs  T(F): 97.7 (14 Apr 2025 13:38)  HR: 100 (14 Apr 2025 14:02)  BP: 120/90 (14 Apr 2025 14:02)  RR: --  SpO2: 100% (14 Apr 2025 13:38)     Parameters below as of 14 Apr 2025 13:38  Patient On (Oxygen Delivery Method): room air      PHYSICAL EXAM:  GENERAL: NAD  HEAD: Atraumatic, Normocephalic  EYES: Conjunctiva and sclera clear  NECK: No JVD  CHEST/LUNG: Patient refused  HEART: Patient refused  ABDOMEN: Patient refused  EXTREMITIES: Patient refused  NEUROLOGY: Patient refused  SKIN: Patient refused      Assessment and Plan:  35F admitted to [facility] for Bipolar disorder, current episode depressed, severe, with psychotic features w/ no known significant PMHx seen at bedside for medical screening evaluation. Interview/screening limited as patient did not want to speak with writer. Patient has no acute medical complaints at this time. Patient denies SOB, CP, abdominal pain. Patient refused physical exam. VSS. Admission labs and EKG pending, to be f/u in AM.    1.) Bipolar disorder, current episode depressed, severe, with psychotic features: Refer to primary team documentation for recs
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
## 2025-04-14 NOTE — BH INPATIENT PSYCHIATRY ASSESSMENT NOTE - NSBHMETABOLIC_PSY_ALL_CORE_FT
BMI: BMI (kg/m2): 27 (04-14-25 @ 13:38)  HbA1c:   Glucose:   BP: --Vital Signs Last 24 Hrs  T(C): 36.5 (04-14-25 @ 13:38), Max: 36.5 (04-14-25 @ 13:38)  T(F): 97.7 (04-14-25 @ 13:38), Max: 97.7 (04-14-25 @ 13:38)  HR: --  BP: --  BP(mean): --  RR: --  SpO2: 100% (04-14-25 @ 13:38) (100% - 100%)    Orthostatic VS  04-14-25 @ 14:02  Lying BP: --/-- HR: --  Sitting BP: 120/90 HR: 100  Standing BP: --/-- HR: --  Site: upper left arm  Mode: auscultated w/ stethoscope  Orthostatic VS  04-14-25 @ 13:38  Lying BP: --/-- HR: --  Sitting BP: 126/106 HR: 100  Standing BP: --/-- HR: --  Site: --  Mode: --    Lipid Panel:
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
## 2025-04-14 NOTE — BH INPATIENT PSYCHIATRY ASSESSMENT NOTE - NSBHCHARTREVIEWVS_PSY_A_CORE FT
Vital Signs Last 24 Hrs  T(C): 36.5 (04-14-25 @ 13:38), Max: 36.5 (04-14-25 @ 13:38)  T(F): 97.7 (04-14-25 @ 13:38), Max: 97.7 (04-14-25 @ 13:38)  HR: --  BP: --  BP(mean): --  RR: --  SpO2: 100% (04-14-25 @ 13:38) (100% - 100%)    Orthostatic VS  04-14-25 @ 14:02  Lying BP: --/-- HR: --  Sitting BP: 120/90 HR: 100  Standing BP: --/-- HR: --  Site: upper left arm  Mode: auscultated w/ stethoscope  Orthostatic VS  04-14-25 @ 13:38  Lying BP: --/-- HR: --  Sitting BP: 126/106 HR: 100  Standing BP: --/-- HR: --  Site: --  Mode: --

## 2025-04-15 PROCEDURE — 99232 SBSQ HOSP IP/OBS MODERATE 35: CPT
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
## 2025-04-15 NOTE — DIETITIAN INITIAL EVALUATION ADULT - FLUID ACCUMULATION
no edema Home Suture Removal Text: Patient was provided instructions on removing sutures and will remove their sutures at home.  If they have any questions or difficulties they will call the office.

## 2025-04-15 NOTE — BH INPATIENT PSYCHIATRY PROGRESS NOTE - NSBHMETABOLIC_PSY_ALL_CORE_FT
BMI: BMI (kg/m2): 27 (04-14-25 @ 13:38)  HbA1c:   Glucose:   BP: --Vital Signs Last 24 Hrs  T(C): 36.4 (04-15-25 @ 07:44), Max: 36.5 (04-14-25 @ 13:38)  T(F): 97.6 (04-15-25 @ 07:44), Max: 97.7 (04-14-25 @ 13:38)  HR: --  BP: --  BP(mean): --  RR: --  SpO2: 100% (04-14-25 @ 13:38) (100% - 100%)    Orthostatic VS  04-15-25 @ 07:44  Lying BP: --/-- HR: --  Sitting BP: 118/88 HR: 104  Standing BP: 113/92 HR: 123  Site: --  Mode: --  Orthostatic VS  04-14-25 @ 14:02  Lying BP: --/-- HR: --  Sitting BP: 120/90 HR: 100  Standing BP: --/-- HR: --  Site: upper left arm  Mode: auscultated w/ stethoscope  Orthostatic VS  04-14-25 @ 13:38  Lying BP: --/-- HR: --  Sitting BP: 126/106 HR: 100  Standing BP: --/-- HR: --  Site: --  Mode: --    Lipid Panel:

## 2025-04-15 NOTE — PSYCHIATRIC REHAB INITIAL EVALUATION - NSBHPRRECOMMEND_PSY_ALL_CORE
The patient demonstrated difficulty engaging with the writer during initial session due to the patient being minimally verbal. Patient was oriented to unit 2W as well as the role/function of  and Inpatient Psychiatric Rehabilitation programming. Per the patient’s chart, she was admitted for worsening anxiety and inability to function.  Writer identified an apt goal for patient defined in the  plan of care. The patient’s Psychiatric Rehabilitation goal is to make at least 3 goal and reality-oriented statements during therapy for her disorganization of thought/ behavior goal. Psychiatric rehabilitation staff will meet with patient weekly to review progress towards identified goal.

## 2025-04-15 NOTE — DIETITIAN INITIAL EVALUATION ADULT - REASON INDICATOR FOR ASSESSMENT
Wants to speak to a dietitian about his/her therapeutic diet, Weight gain or weight loss of more than 10 lbs. within the last 3 months

## 2025-04-15 NOTE — DIETITIAN INITIAL EVALUATION ADULT - ADD RECOMMEND
- May consider daily MVI for micronutrient coverage if no medical contraindications per MD's discretion.   - Encourage po intake as tolerated and honor food preferences PRN.  - Monitor PO intake/tolerance, weights, labs, GI and skin integrity.

## 2025-04-15 NOTE — BH INPATIENT PSYCHIATRY PROGRESS NOTE - NSBHCHARTREVIEWVS_PSY_A_CORE FT
Vital Signs Last 24 Hrs  T(C): 36.4 (04-15-25 @ 07:44), Max: 36.5 (04-14-25 @ 13:38)  T(F): 97.6 (04-15-25 @ 07:44), Max: 97.7 (04-14-25 @ 13:38)  HR: --  BP: --  BP(mean): --  RR: --  SpO2: 100% (04-14-25 @ 13:38) (100% - 100%)    Orthostatic VS  04-15-25 @ 07:44  Lying BP: --/-- HR: --  Sitting BP: 118/88 HR: 104  Standing BP: 113/92 HR: 123  Site: --  Mode: --  Orthostatic VS  04-14-25 @ 14:02  Lying BP: --/-- HR: --  Sitting BP: 120/90 HR: 100  Standing BP: --/-- HR: --  Site: upper left arm  Mode: auscultated w/ stethoscope  Orthostatic VS  04-14-25 @ 13:38  Lying BP: --/-- HR: --  Sitting BP: 126/106 HR: 100  Standing BP: --/-- HR: --  Site: --  Mode: --

## 2025-04-16 PROCEDURE — 99232 SBSQ HOSP IP/OBS MODERATE 35: CPT

## 2025-04-16 NOTE — BH INPATIENT PSYCHIATRY PROGRESS NOTE - NSBHCHARTREVIEWVS_PSY_A_CORE FT
Vital Signs Last 24 Hrs  T(C): --  T(F): --  HR: --  BP: --  BP(mean): --  RR: --  SpO2: --    Orthostatic VS  04-15-25 @ 07:44  Lying BP: --/-- HR: --  Sitting BP: 118/88 HR: 104  Standing BP: 113/92 HR: 123  Site: --  Mode: --  Orthostatic VS  04-14-25 @ 14:02  Lying BP: --/-- HR: --  Sitting BP: 120/90 HR: 100  Standing BP: --/-- HR: --  Site: upper left arm  Mode: auscultated w/ stethoscope  Orthostatic VS  04-14-25 @ 13:38  Lying BP: --/-- HR: --  Sitting BP: 126/106 HR: 100  Standing BP: --/-- HR: --  Site: --  Mode: --

## 2025-04-16 NOTE — BH INPATIENT PSYCHIATRY PROGRESS NOTE - NSBHMETABOLIC_PSY_ALL_CORE_FT
BMI: BMI (kg/m2): 27 (04-14-25 @ 13:38)  HbA1c:   Glucose:   BP: --Vital Signs Last 24 Hrs  T(C): --  T(F): --  HR: --  BP: --  BP(mean): --  RR: --  SpO2: --    Orthostatic VS  04-15-25 @ 07:44  Lying BP: --/-- HR: --  Sitting BP: 118/88 HR: 104  Standing BP: 113/92 HR: 123  Site: --  Mode: --  Orthostatic VS  04-14-25 @ 14:02  Lying BP: --/-- HR: --  Sitting BP: 120/90 HR: 100  Standing BP: --/-- HR: --  Site: upper left arm  Mode: auscultated w/ stethoscope  Orthostatic VS  04-14-25 @ 13:38  Lying BP: --/-- HR: --  Sitting BP: 126/106 HR: 100  Standing BP: --/-- HR: --  Site: --  Mode: --    Lipid Panel:

## 2025-04-16 NOTE — BH SOCIAL WORK INITIAL PSYCHOSOCIAL EVALUATION - OTHER PAST PSYCHIATRIC HISTORY (INCLUDE DETAILS REGARDING ONSET, COURSE OF ILLNESS, INPATIENT/OUTPATIENT TREATMENT)
Pt does not have pertinent changes since last admission -    "Patient is a 34 year old female who lives alone and works as a . Her collateral contact is listed as her mother, Amanda, [phone]. The patient has no previous inpatient psychiatric hospitalizations, but has a history of Anxiety, Depression, and PTSD. She has no reported history of trauma, substance abuse, suicidality/homicidality, SIB, aggression or legal issues. She was in treatment at  after her father . She was in therapy for about ten years ending in May, 2024. She is currently not in outpatient treatment. She has been increasingly anxious with no known trigger. She has had poor appetite, mood, sleep, and interest. She has lost 14 pounds, unintentionally. The patient [facility] to the [facility] in September and 2024. The patient has been struggling to leave her house and reports significant social anxiety. She is motivated fort treatment. She, initially, agreed to a trial of medication, but then declined. She appeared anxious, calm and cooperative, but was oddly related and has, reportedly, been seen on the unit responding to internal stimuli. Patient would likely benefit from comprehensive outpatient treatment when she is stable. She does not have current providers. The patient has Medicaid, #[id number]."

## 2025-04-17 PROCEDURE — 99232 SBSQ HOSP IP/OBS MODERATE 35: CPT

## 2025-04-17 NOTE — BH INPATIENT PSYCHIATRY PROGRESS NOTE - ATTENDING COMMENTS
Care was discussed and reviewed in the interdisciplinary treatment team.  I  have reviewed and verified the documentation and agreed with the findings and plans.

## 2025-04-17 NOTE — BH INPATIENT PSYCHIATRY PROGRESS NOTE - NSBHMETABOLIC_PSY_ALL_CORE_FT
BMI: BMI (kg/m2): 27 (04-14-25 @ 13:38)  HbA1c:   Glucose:   BP: --Vital Signs Last 24 Hrs  T(C): --  T(F): --  HR: --  BP: --  BP(mean): --  RR: --  SpO2: --      Lipid Panel:

## 2025-04-18 PROCEDURE — 99232 SBSQ HOSP IP/OBS MODERATE 35: CPT

## 2025-04-21 PROCEDURE — 99232 SBSQ HOSP IP/OBS MODERATE 35: CPT

## 2025-04-21 RX ORDER — LORAZEPAM 4 MG/ML
2 VIAL (ML) INJECTION EVERY 6 HOURS
Refills: 0 | Status: DISCONTINUED | OUTPATIENT
Start: 2025-04-21 | End: 2025-04-28

## 2025-04-21 RX ORDER — LORAZEPAM 4 MG/ML
2 VIAL (ML) INJECTION ONCE
Refills: 0 | Status: DISCONTINUED | OUTPATIENT
Start: 2025-04-21 | End: 2025-04-28
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
## 2025-04-21 NOTE — BH INPATIENT PSYCHIATRY PROGRESS NOTE - NSBHCHARTREVIEWVS_PSY_A_CORE FT
Vital Signs Last 24 Hrs  T(C): --  T(F): --  HR: 81 (04-21-25 @ 13:01) (81 - 81)  BP: 109/69 (04-21-25 @ 13:01) (109/69 - 109/69)  BP(mean): --  RR: --  SpO2: --

## 2025-04-21 NOTE — BH INPATIENT PSYCHIATRY PROGRESS NOTE - NSBHMETABOLIC_PSY_ALL_CORE_FT
BMI: BMI (kg/m2): 27 (04-14-25 @ 13:38)  HbA1c:   Glucose:   BP: 109/69 (04-21-25 @ 13:01) (109/69 - 116/78)Vital Signs Last 24 Hrs  T(C): --  T(F): --  HR: 81 (04-21-25 @ 13:01) (81 - 81)  BP: 109/69 (04-21-25 @ 13:01) (109/69 - 109/69)  BP(mean): --  RR: --  SpO2: --      Lipid Panel:

## 2025-04-22 PROCEDURE — 99232 SBSQ HOSP IP/OBS MODERATE 35: CPT

## 2025-04-22 NOTE — BH INPATIENT PSYCHIATRY PROGRESS NOTE - ATTENDING COMMENTS
Care was discussed and reviewed in the interdisciplinary treatment team.  I, Marielle Cook MD, have reviewed and verified the documentation.  I independently performed the documented medical decision making.      Patient remains catatonic and unable to participate in the assessment. TOO was granted but we are still waiting for the signed court order.

## 2025-04-23 LAB
A1C WITH ESTIMATED AVERAGE GLUCOSE RESULT: 5.2 % — SIGNIFICANT CHANGE UP (ref 4–5.6)
ALBUMIN SERPL ELPH-MCNC: 4.5 G/DL — SIGNIFICANT CHANGE UP (ref 3.3–5)
ALP SERPL-CCNC: 50 U/L — SIGNIFICANT CHANGE UP (ref 40–120)
ALT FLD-CCNC: 39 U/L — HIGH (ref 4–33)
ANION GAP SERPL CALC-SCNC: 17 MMOL/L — HIGH (ref 7–14)
AST SERPL-CCNC: 29 U/L — SIGNIFICANT CHANGE UP (ref 4–32)
BASOPHILS # BLD AUTO: 0.03 K/UL — SIGNIFICANT CHANGE UP (ref 0–0.2)
BASOPHILS NFR BLD AUTO: 0.5 % — SIGNIFICANT CHANGE UP (ref 0–2)
BILIRUB SERPL-MCNC: 1 MG/DL — SIGNIFICANT CHANGE UP (ref 0.2–1.2)
BUN SERPL-MCNC: 13 MG/DL — SIGNIFICANT CHANGE UP (ref 7–23)
CALCIUM SERPL-MCNC: 10.2 MG/DL — SIGNIFICANT CHANGE UP (ref 8.4–10.5)
CHLORIDE SERPL-SCNC: 97 MMOL/L — LOW (ref 98–107)
CHOLEST SERPL-MCNC: 207 MG/DL — HIGH
CO2 SERPL-SCNC: 22 MMOL/L — SIGNIFICANT CHANGE UP (ref 22–31)
CREAT SERPL-MCNC: 0.84 MG/DL — SIGNIFICANT CHANGE UP (ref 0.5–1.3)
EGFR: 92 ML/MIN/1.73M2 — SIGNIFICANT CHANGE UP
EGFR: 92 ML/MIN/1.73M2 — SIGNIFICANT CHANGE UP
EOSINOPHIL # BLD AUTO: 0.05 K/UL — SIGNIFICANT CHANGE UP (ref 0–0.5)
EOSINOPHIL NFR BLD AUTO: 0.9 % — SIGNIFICANT CHANGE UP (ref 0–6)
ESTIMATED AVERAGE GLUCOSE: 103 — SIGNIFICANT CHANGE UP
GLUCOSE SERPL-MCNC: 126 MG/DL — HIGH (ref 70–99)
HCT VFR BLD CALC: 41.7 % — SIGNIFICANT CHANGE UP (ref 34.5–45)
HDLC SERPL-MCNC: 56 MG/DL — SIGNIFICANT CHANGE UP
HGB BLD-MCNC: 14.1 G/DL — SIGNIFICANT CHANGE UP (ref 11.5–15.5)
IANC: 1.87 K/UL — SIGNIFICANT CHANGE UP (ref 1.8–7.4)
IMM GRANULOCYTES NFR BLD AUTO: 0.2 % — SIGNIFICANT CHANGE UP (ref 0–0.9)
LDLC SERPL-MCNC: 138 MG/DL — HIGH
LIPID PNL WITH DIRECT LDL SERPL: 138 MG/DL — HIGH
LYMPHOCYTES # BLD AUTO: 3.01 K/UL — SIGNIFICANT CHANGE UP (ref 1–3.3)
LYMPHOCYTES # BLD AUTO: 53.8 % — HIGH (ref 13–44)
MCHC RBC-ENTMCNC: 30 PG — SIGNIFICANT CHANGE UP (ref 27–34)
MCHC RBC-ENTMCNC: 33.8 G/DL — SIGNIFICANT CHANGE UP (ref 32–36)
MCV RBC AUTO: 88.7 FL — SIGNIFICANT CHANGE UP (ref 80–100)
MONOCYTES # BLD AUTO: 0.62 K/UL — SIGNIFICANT CHANGE UP (ref 0–0.9)
MONOCYTES NFR BLD AUTO: 11.1 % — SIGNIFICANT CHANGE UP (ref 2–14)
NEUTROPHILS # BLD AUTO: 1.87 K/UL — SIGNIFICANT CHANGE UP (ref 1.8–7.4)
NEUTROPHILS NFR BLD AUTO: 33.5 % — LOW (ref 43–77)
NONHDLC SERPL-MCNC: 151 MG/DL — HIGH
NRBC # BLD AUTO: 0 K/UL — SIGNIFICANT CHANGE UP (ref 0–0)
NRBC # FLD: 0 K/UL — SIGNIFICANT CHANGE UP (ref 0–0)
NRBC BLD AUTO-RTO: 0 /100 WBCS — SIGNIFICANT CHANGE UP (ref 0–0)
PLATELET # BLD AUTO: 335 K/UL — SIGNIFICANT CHANGE UP (ref 150–400)
POTASSIUM SERPL-MCNC: 4.3 MMOL/L — SIGNIFICANT CHANGE UP (ref 3.5–5.3)
POTASSIUM SERPL-SCNC: 4.3 MMOL/L — SIGNIFICANT CHANGE UP (ref 3.5–5.3)
PROT SERPL-MCNC: 8.3 G/DL — SIGNIFICANT CHANGE UP (ref 6–8.3)
RBC # BLD: 4.7 M/UL — SIGNIFICANT CHANGE UP (ref 3.8–5.2)
RBC # FLD: 14.1 % — SIGNIFICANT CHANGE UP (ref 10.3–14.5)
SODIUM SERPL-SCNC: 136 MMOL/L — SIGNIFICANT CHANGE UP (ref 135–145)
TRIGL SERPL-MCNC: 76 MG/DL — SIGNIFICANT CHANGE UP
TSH SERPL-MCNC: 1.79 UIU/ML — SIGNIFICANT CHANGE UP (ref 0.27–4.2)
WBC # BLD: 5.59 K/UL — SIGNIFICANT CHANGE UP (ref 3.8–10.5)
WBC # FLD AUTO: 5.59 K/UL — SIGNIFICANT CHANGE UP (ref 3.8–10.5)

## 2025-04-23 PROCEDURE — 99232 SBSQ HOSP IP/OBS MODERATE 35: CPT

## 2025-04-23 RX ORDER — OLANZAPINE 10 MG/1
5 TABLET ORAL ONCE
Refills: 0 | Status: DISCONTINUED | OUTPATIENT
Start: 2025-04-23 | End: 2025-04-30

## 2025-04-23 RX ORDER — PALIPERIDONE 9 MG/1
3 TABLET, EXTENDED RELEASE ORAL AT BEDTIME
Refills: 0 | Status: DISCONTINUED | OUTPATIENT
Start: 2025-04-23 | End: 2025-04-28

## 2025-04-23 RX ORDER — LORAZEPAM 4 MG/ML
2 VIAL (ML) INJECTION ONCE
Refills: 0 | Status: DISCONTINUED | OUTPATIENT
Start: 2025-04-23 | End: 2025-04-28

## 2025-04-23 RX ADMIN — PALIPERIDONE 3 MILLIGRAM(S): 9 TABLET, EXTENDED RELEASE ORAL at 20:40

## 2025-04-23 RX ADMIN — Medication 2 MILLIGRAM(S): at 11:40

## 2025-04-23 NOTE — BH INPATIENT PSYCHIATRY PROGRESS NOTE - ATTENDING COMMENTS
Care was discussed and reviewed in the interdisciplinary treatment team.  I, Marielle Cook MD, have reviewed and verified the documentation.  I independently performed the documented medical decision making.      Care was discussed and reviewed in the interdisciplinary treatment team.  I, Marielle Cook MD, have reviewed and verified the documentation.  I independently performed the documented medical decision making.      Patient refused to engage in an assessment with this writer today. She has been informed that the TOO was granted. Patient refused education about the medication or the TOO order. Later on PES needed to assist with the medication administration. After several hours patient was seen walking around the unit. She remains malodorous, disheveled and with poor hygiene. Patient doesn't interact with her peers or the staff. She is constantly talking to herself.

## 2025-04-23 NOTE — BH INPATIENT PSYCHIATRY PROGRESS NOTE - CURRENT MEDICATION
MEDICATIONS  (STANDING):  paliperidone ER 3 milliGRAM(s) Oral at bedtime  risperiDONE  Disintegrating Tablet 1 milliGRAM(s) Oral at bedtime    MEDICATIONS  (PRN):  acetaminophen     Tablet .. 650 milliGRAM(s) Oral every 6 hours PRN Mild Pain (1 - 3), Moderate Pain (4 - 6)  hydrOXYzine hydrochloride 50 milliGRAM(s) Oral every 8 hours PRN anxiety  LORazepam     Tablet 2 milliGRAM(s) Oral every 6 hours PRN agitation  LORazepam   Injectable 2 milliGRAM(s) IntraMuscular once PRN severe agitation  LORazepam   Injectable 2 milliGRAM(s) IntraMuscular once PRN Catatonia  melatonin. 3 milliGRAM(s) Oral at bedtime PRN Insomnia  OLANZapine Injectable 5 milliGRAM(s) IntraMuscular once PRN TOO court order for psychosis

## 2025-04-23 NOTE — BH INPATIENT PSYCHIATRY PROGRESS NOTE - NSBHMETABOLIC_PSY_ALL_CORE_FT
BMI: BMI (kg/m2): 27 (04-14-25 @ 13:38)  HbA1c:   Glucose:   BP: 109/69 (04-21-25 @ 13:01) (109/69 - 109/69)Vital Signs Last 24 Hrs  T(C): --  T(F): --  HR: --  BP: --  BP(mean): --  RR: --  SpO2: --      Lipid Panel:  BMI: BMI (kg/m2): 27 (04-14-25 @ 13:38)  HbA1c: A1C with Estimated Average Glucose Result: 5.2 % (04-23-25 @ 12:32)    Glucose:   BP: 109/69 (04-21-25 @ 13:01) (109/69 - 109/69)Vital Signs Last 24 Hrs  T(C): --  T(F): --  HR: --  BP: --  BP(mean): --  RR: --  SpO2: --      Lipid Panel: Date/Time: 04-23-25 @ 12:32  Cholesterol, Serum: 207  LDL Cholesterol Calculated: 138  HDL Cholesterol, Serum: 56  Total Cholesterol/HDL Ration Measurement: --  Triglycerides, Serum: 76

## 2025-04-24 LAB
ADD ON TEST-SPECIMEN IN LAB: SIGNIFICANT CHANGE UP
HCG SERPL-ACNC: <1 MIU/ML — SIGNIFICANT CHANGE UP

## 2025-04-24 PROCEDURE — 99232 SBSQ HOSP IP/OBS MODERATE 35: CPT

## 2025-04-24 RX ADMIN — PALIPERIDONE 3 MILLIGRAM(S): 9 TABLET, EXTENDED RELEASE ORAL at 21:48

## 2025-04-24 NOTE — BH INPATIENT PSYCHIATRY PROGRESS NOTE - ATTENDING COMMENTS
Care was discussed and reviewed in the interdisciplinary treatment team.  I, Marielle Cook MD, have reviewed and verified the documentation.  I independently performed the documented medical decision making.

## 2025-04-24 NOTE — BH INPATIENT PSYCHIATRY PROGRESS NOTE - CURRENT MEDICATION
MEDICATIONS  (STANDING):  paliperidone ER 3 milliGRAM(s) Oral at bedtime    MEDICATIONS  (PRN):  acetaminophen     Tablet .. 650 milliGRAM(s) Oral every 6 hours PRN Mild Pain (1 - 3), Moderate Pain (4 - 6)  hydrOXYzine hydrochloride 50 milliGRAM(s) Oral every 8 hours PRN anxiety  LORazepam     Tablet 2 milliGRAM(s) Oral every 6 hours PRN agitation  LORazepam   Injectable 2 milliGRAM(s) IntraMuscular once PRN severe agitation  LORazepam   Injectable 2 milliGRAM(s) IntraMuscular once PRN Catatonia  melatonin. 3 milliGRAM(s) Oral at bedtime PRN Insomnia  OLANZapine Injectable 5 milliGRAM(s) IntraMuscular once PRN TOO court order for psychosis

## 2025-04-24 NOTE — BH INPATIENT PSYCHIATRY PROGRESS NOTE - NSBHCHARTREVIEWVS_PSY_A_CORE FT
Vital Signs Last 24 Hrs  T(C): 36.7 (04-24-25 @ 08:00), Max: 36.7 (04-24-25 @ 08:00)  T(F): 98.1 (04-24-25 @ 08:00), Max: 98.1 (04-24-25 @ 08:00)  HR: 79 (04-24-25 @ 08:00) (79 - 79)  BP: 114/79 (04-24-25 @ 08:00) (114/79 - 114/79)  BP(mean): --  RR: --  SpO2: --    Orthostatic VS  04-23-25 @ 11:45  Lying BP: --/-- HR: --  Sitting BP: 127/100 HR: 107  Standing BP: 127/96 HR: 128  Site: --  Mode: --

## 2025-04-24 NOTE — BH INPATIENT PSYCHIATRY PROGRESS NOTE - NSBHMETABOLIC_PSY_ALL_CORE_FT
BMI: BMI (kg/m2): 27 (04-14-25 @ 13:38)  HbA1c: A1C with Estimated Average Glucose Result: 5.2 % (04-23-25 @ 12:32)    Glucose:   BP: 114/79 (04-24-25 @ 08:00) (114/79 - 114/79)Vital Signs Last 24 Hrs  T(C): 36.7 (04-24-25 @ 08:00), Max: 36.7 (04-24-25 @ 08:00)  T(F): 98.1 (04-24-25 @ 08:00), Max: 98.1 (04-24-25 @ 08:00)  HR: 79 (04-24-25 @ 08:00) (79 - 79)  BP: 114/79 (04-24-25 @ 08:00) (114/79 - 114/79)  BP(mean): --  RR: --  SpO2: --    Orthostatic VS  04-23-25 @ 11:45  Lying BP: --/-- HR: --  Sitting BP: 127/100 HR: 107  Standing BP: 127/96 HR: 128  Site: --  Mode: --    Lipid Panel: Date/Time: 04-23-25 @ 12:32  Cholesterol, Serum: 207  LDL Cholesterol Calculated: 138  HDL Cholesterol, Serum: 56  Total Cholesterol/HDL Ration Measurement: --  Triglycerides, Serum: 76

## 2025-04-25 PROCEDURE — 99232 SBSQ HOSP IP/OBS MODERATE 35: CPT

## 2025-04-25 RX ORDER — OLANZAPINE 10 MG/1
5 TABLET ORAL ONCE
Refills: 0 | Status: COMPLETED | OUTPATIENT
Start: 2025-04-25 | End: 2025-04-25

## 2025-04-25 RX ADMIN — OLANZAPINE 5 MILLIGRAM(S): 10 TABLET ORAL at 22:00

## 2025-04-25 NOTE — BH INPATIENT PSYCHIATRY PROGRESS NOTE - NSBHCHARTREVIEWVS_PSY_A_CORE FT
Vital Signs Last 24 Hrs  T(C): 36.3 (04-25-25 @ 09:09), Max: 36.3 (04-25-25 @ 09:09)  T(F): 97.3 (04-25-25 @ 09:09), Max: 97.3 (04-25-25 @ 09:09)  HR: 83 (04-25-25 @ 09:09) (83 - 83)  BP: 121/83 (04-25-25 @ 09:09) (121/83 - 121/83)  BP(mean): --  RR: --  SpO2: --

## 2025-04-25 NOTE — BH INPATIENT PSYCHIATRY PROGRESS NOTE - NSBHMETABOLIC_PSY_ALL_CORE_FT
BMI: BMI (kg/m2): 27 (04-14-25 @ 13:38)  HbA1c: A1C with Estimated Average Glucose Result: 5.2 % (04-23-25 @ 12:32)    Glucose:   BP: 121/83 (04-25-25 @ 09:09) (114/79 - 121/83)Vital Signs Last 24 Hrs  T(C): 36.3 (04-25-25 @ 09:09), Max: 36.3 (04-25-25 @ 09:09)  T(F): 97.3 (04-25-25 @ 09:09), Max: 97.3 (04-25-25 @ 09:09)  HR: 83 (04-25-25 @ 09:09) (83 - 83)  BP: 121/83 (04-25-25 @ 09:09) (121/83 - 121/83)  BP(mean): --  RR: --  SpO2: --      Lipid Panel: Date/Time: 04-23-25 @ 12:32  Cholesterol, Serum: 207  LDL Cholesterol Calculated: 138  HDL Cholesterol, Serum: 56  Total Cholesterol/HDL Ration Measurement: --  Triglycerides, Serum: 76

## 2025-04-26 RX ADMIN — PALIPERIDONE 3 MILLIGRAM(S): 9 TABLET, EXTENDED RELEASE ORAL at 20:30

## 2025-04-27 RX ADMIN — PALIPERIDONE 3 MILLIGRAM(S): 9 TABLET, EXTENDED RELEASE ORAL at 20:47

## 2025-04-28 PROCEDURE — 99232 SBSQ HOSP IP/OBS MODERATE 35: CPT

## 2025-04-28 RX ORDER — LORAZEPAM 4 MG/ML
2 VIAL (ML) INJECTION EVERY 6 HOURS
Refills: 0 | Status: DISCONTINUED | OUTPATIENT
Start: 2025-04-28 | End: 2025-05-05

## 2025-04-28 RX ORDER — PALIPERIDONE 9 MG/1
4.5 TABLET, EXTENDED RELEASE ORAL AT BEDTIME
Refills: 0 | Status: DISCONTINUED | OUTPATIENT
Start: 2025-04-28 | End: 2025-04-29

## 2025-04-28 RX ORDER — LORAZEPAM 4 MG/ML
2 VIAL (ML) INJECTION ONCE
Refills: 0 | Status: DISCONTINUED | OUTPATIENT
Start: 2025-04-28 | End: 2025-05-05

## 2025-04-28 RX ADMIN — PALIPERIDONE 4.5 MILLIGRAM(S): 9 TABLET, EXTENDED RELEASE ORAL at 21:27

## 2025-04-28 NOTE — BH INPATIENT PSYCHIATRY PROGRESS NOTE - NSBHMETABOLIC_PSY_ALL_CORE_FT
BMI: BMI (kg/m2): 27 (04-14-25 @ 13:38)  HbA1c: A1C with Estimated Average Glucose Result: 5.2 % (04-23-25 @ 12:32)    Glucose:   BP: 127/86 (04-28-25 @ 09:16) (107/76 - 135/88)Vital Signs Last 24 Hrs  T(C): 36.2 (04-28-25 @ 09:16), Max: 36.2 (04-28-25 @ 09:16)  T(F): 97.1 (04-28-25 @ 09:16), Max: 97.1 (04-28-25 @ 09:16)  HR: 92 (04-28-25 @ 09:16) (92 - 92)  BP: 127/86 (04-28-25 @ 09:16) (127/86 - 127/86)  BP(mean): --  RR: --  SpO2: --      Lipid Panel: Date/Time: 04-23-25 @ 12:32  Cholesterol, Serum: 207  LDL Cholesterol Calculated: 138  HDL Cholesterol, Serum: 56  Total Cholesterol/HDL Ration Measurement: --  Triglycerides, Serum: 76

## 2025-04-28 NOTE — BH INPATIENT PSYCHIATRY PROGRESS NOTE - CURRENT MEDICATION
MEDICATIONS  (STANDING):  paliperidone ER 3 milliGRAM(s) Oral at bedtime    MEDICATIONS  (PRN):  acetaminophen     Tablet .. 650 milliGRAM(s) Oral every 6 hours PRN Mild Pain (1 - 3), Moderate Pain (4 - 6)  hydrOXYzine hydrochloride 50 milliGRAM(s) Oral every 8 hours PRN anxiety  LORazepam     Tablet 2 milliGRAM(s) Oral every 6 hours PRN agitation  LORazepam   Injectable 2 milliGRAM(s) IntraMuscular once PRN severe agitation  LORazepam   Injectable 2 milliGRAM(s) IntraMuscular once PRN Catatonia  melatonin. 3 milliGRAM(s) Oral at bedtime PRN Insomnia  OLANZapine Injectable 5 milliGRAM(s) IntraMuscular once PRN TOO court order for psychosis   MEDICATIONS  (STANDING):  paliperidone ER 4.5 milliGRAM(s) Oral at bedtime    MEDICATIONS  (PRN):  acetaminophen     Tablet .. 650 milliGRAM(s) Oral every 6 hours PRN Mild Pain (1 - 3), Moderate Pain (4 - 6)  hydrOXYzine hydrochloride 50 milliGRAM(s) Oral every 8 hours PRN anxiety  LORazepam     Tablet 2 milliGRAM(s) Oral every 6 hours PRN agitation  LORazepam   Injectable 2 milliGRAM(s) IntraMuscular once PRN severe agitation  LORazepam   Injectable 2 milliGRAM(s) IntraMuscular once PRN Catatonia  melatonin. 3 milliGRAM(s) Oral at bedtime PRN Insomnia  OLANZapine Injectable 5 milliGRAM(s) IntraMuscular once PRN TOO court order for psychosis

## 2025-04-28 NOTE — BH INPATIENT PSYCHIATRY PROGRESS NOTE - NSBHCHARTREVIEWVS_PSY_A_CORE FT
Vital Signs Last 24 Hrs  T(C): 36.2 (04-28-25 @ 09:16), Max: 36.2 (04-28-25 @ 09:16)  T(F): 97.1 (04-28-25 @ 09:16), Max: 97.1 (04-28-25 @ 09:16)  HR: 92 (04-28-25 @ 09:16) (92 - 92)  BP: 127/86 (04-28-25 @ 09:16) (127/86 - 127/86)  BP(mean): --  RR: --  SpO2: --

## 2025-04-29 LAB
ALBUMIN SERPL ELPH-MCNC: 3.5 G/DL — SIGNIFICANT CHANGE UP (ref 3.3–5)
ALP SERPL-CCNC: 37 U/L — LOW (ref 40–120)
ALT FLD-CCNC: 18 U/L — SIGNIFICANT CHANGE UP (ref 4–33)
ANION GAP SERPL CALC-SCNC: 11 MMOL/L — SIGNIFICANT CHANGE UP (ref 7–14)
AST SERPL-CCNC: 18 U/L — SIGNIFICANT CHANGE UP (ref 4–32)
BASOPHILS # BLD AUTO: 0.03 K/UL — SIGNIFICANT CHANGE UP (ref 0–0.2)
BASOPHILS NFR BLD AUTO: 0.7 % — SIGNIFICANT CHANGE UP (ref 0–2)
BILIRUB SERPL-MCNC: 0.3 MG/DL — SIGNIFICANT CHANGE UP (ref 0.2–1.2)
BUN SERPL-MCNC: 6 MG/DL — LOW (ref 7–23)
CALCIUM SERPL-MCNC: 9.5 MG/DL — SIGNIFICANT CHANGE UP (ref 8.4–10.5)
CHLORIDE SERPL-SCNC: 109 MMOL/L — HIGH (ref 98–107)
CO2 SERPL-SCNC: 23 MMOL/L — SIGNIFICANT CHANGE UP (ref 22–31)
CREAT SERPL-MCNC: 0.77 MG/DL — SIGNIFICANT CHANGE UP (ref 0.5–1.3)
EGFR: 102 ML/MIN/1.73M2 — SIGNIFICANT CHANGE UP
EGFR: 102 ML/MIN/1.73M2 — SIGNIFICANT CHANGE UP
EOSINOPHIL # BLD AUTO: 0.09 K/UL — SIGNIFICANT CHANGE UP (ref 0–0.5)
EOSINOPHIL NFR BLD AUTO: 2 % — SIGNIFICANT CHANGE UP (ref 0–6)
GLUCOSE SERPL-MCNC: 82 MG/DL — SIGNIFICANT CHANGE UP (ref 70–99)
HCT VFR BLD CALC: 38.3 % — SIGNIFICANT CHANGE UP (ref 34.5–45)
HGB BLD-MCNC: 12.7 G/DL — SIGNIFICANT CHANGE UP (ref 11.5–15.5)
IANC: 1.06 K/UL — LOW (ref 1.8–7.4)
IMM GRANULOCYTES NFR BLD AUTO: 0 % — SIGNIFICANT CHANGE UP (ref 0–0.9)
LYMPHOCYTES # BLD AUTO: 2.88 K/UL — SIGNIFICANT CHANGE UP (ref 1–3.3)
LYMPHOCYTES # BLD AUTO: 64.7 % — HIGH (ref 13–44)
MAGNESIUM SERPL-MCNC: 1.8 MG/DL — SIGNIFICANT CHANGE UP (ref 1.6–2.6)
MCHC RBC-ENTMCNC: 30.2 PG — SIGNIFICANT CHANGE UP (ref 27–34)
MCHC RBC-ENTMCNC: 33.2 G/DL — SIGNIFICANT CHANGE UP (ref 32–36)
MCV RBC AUTO: 91 FL — SIGNIFICANT CHANGE UP (ref 80–100)
MONOCYTES # BLD AUTO: 0.39 K/UL — SIGNIFICANT CHANGE UP (ref 0–0.9)
MONOCYTES NFR BLD AUTO: 8.8 % — SIGNIFICANT CHANGE UP (ref 2–14)
NEUTROPHILS # BLD AUTO: 1.06 K/UL — LOW (ref 1.8–7.4)
NEUTROPHILS NFR BLD AUTO: 23.8 % — LOW (ref 43–77)
NRBC # BLD AUTO: 0 K/UL — SIGNIFICANT CHANGE UP (ref 0–0)
NRBC # FLD: 0 K/UL — SIGNIFICANT CHANGE UP (ref 0–0)
NRBC BLD AUTO-RTO: 0 /100 WBCS — SIGNIFICANT CHANGE UP (ref 0–0)
PHOSPHATE SERPL-MCNC: 2.4 MG/DL — LOW (ref 2.5–4.5)
PLATELET # BLD AUTO: 316 K/UL — SIGNIFICANT CHANGE UP (ref 150–400)
POTASSIUM SERPL-MCNC: 4.8 MMOL/L — SIGNIFICANT CHANGE UP (ref 3.5–5.3)
POTASSIUM SERPL-SCNC: 4.8 MMOL/L — SIGNIFICANT CHANGE UP (ref 3.5–5.3)
PROT SERPL-MCNC: 6.5 G/DL — SIGNIFICANT CHANGE UP (ref 6–8.3)
RBC # BLD: 4.21 M/UL — SIGNIFICANT CHANGE UP (ref 3.8–5.2)
RBC # FLD: 14.2 % — SIGNIFICANT CHANGE UP (ref 10.3–14.5)
SODIUM SERPL-SCNC: 143 MMOL/L — SIGNIFICANT CHANGE UP (ref 135–145)
WBC # BLD: 4.45 K/UL — SIGNIFICANT CHANGE UP (ref 3.8–10.5)
WBC # FLD AUTO: 4.45 K/UL — SIGNIFICANT CHANGE UP (ref 3.8–10.5)

## 2025-04-29 PROCEDURE — 99223 1ST HOSP IP/OBS HIGH 75: CPT

## 2025-04-29 PROCEDURE — 99232 SBSQ HOSP IP/OBS MODERATE 35: CPT

## 2025-04-29 RX ORDER — SOD PHOS DI, MONO/K PHOS MONO 250 MG
1 TABLET ORAL
Refills: 0 | Status: COMPLETED | OUTPATIENT
Start: 2025-04-29 | End: 2025-04-29

## 2025-04-29 RX ADMIN — Medication 1 TABLET(S): at 17:30

## 2025-04-29 NOTE — BH INPATIENT PSYCHIATRY PROGRESS NOTE - NSBHMETABOLIC_PSY_ALL_CORE_FT
BMI: BMI (kg/m2): 27 (04-14-25 @ 13:38)  HbA1c: A1C with Estimated Average Glucose Result: 5.2 % (04-23-25 @ 12:32)    Glucose:   BP: 112/66 (04-29-25 @ 08:59) (107/76 - 127/86)Vital Signs Last 24 Hrs  T(C): 36.4 (04-29-25 @ 08:59), Max: 36.4 (04-29-25 @ 08:59)  T(F): 97.6 (04-29-25 @ 08:59), Max: 97.6 (04-29-25 @ 08:59)  HR: 96 (04-29-25 @ 08:59) (96 - 96)  BP: 112/66 (04-29-25 @ 08:59) (112/66 - 112/66)  BP(mean): --  RR: --  SpO2: --      Lipid Panel: Date/Time: 04-23-25 @ 12:32  Cholesterol, Serum: 207  LDL Cholesterol Calculated: 138  HDL Cholesterol, Serum: 56  Total Cholesterol/HDL Ration Measurement: --  Triglycerides, Serum: 76

## 2025-04-29 NOTE — BH INPATIENT PSYCHIATRY PROGRESS NOTE - NSBHCHARTREVIEWVS_PSY_A_CORE FT
Vital Signs Last 24 Hrs  T(C): 36.4 (04-29-25 @ 08:59), Max: 36.4 (04-29-25 @ 08:59)  T(F): 97.6 (04-29-25 @ 08:59), Max: 97.6 (04-29-25 @ 08:59)  HR: 96 (04-29-25 @ 08:59) (96 - 96)  BP: 112/66 (04-29-25 @ 08:59) (112/66 - 112/66)  BP(mean): --  RR: --  SpO2: --

## 2025-04-29 NOTE — BH INPATIENT PSYCHIATRY PROGRESS NOTE - CURRENT MEDICATION
MEDICATIONS  (STANDING):  paliperidone ER 4.5 milliGRAM(s) Oral at bedtime    MEDICATIONS  (PRN):  acetaminophen     Tablet .. 650 milliGRAM(s) Oral every 6 hours PRN Mild Pain (1 - 3), Moderate Pain (4 - 6)  hydrOXYzine hydrochloride 50 milliGRAM(s) Oral every 8 hours PRN anxiety  LORazepam     Tablet 2 milliGRAM(s) Oral every 6 hours PRN agitation  LORazepam   Injectable 2 milliGRAM(s) IntraMuscular once PRN severe agitation  LORazepam   Injectable 2 milliGRAM(s) IntraMuscular once PRN Catatonia  melatonin. 3 milliGRAM(s) Oral at bedtime PRN Insomnia  OLANZapine Injectable 5 milliGRAM(s) IntraMuscular once PRN TOO court order for psychosis

## 2025-04-29 NOTE — CONSULT NOTE ADULT - SUBJECTIVE AND OBJECTIVE BOX
HPI:   34 y.o. F with PMH of HTN, migraines, asthma, anxiety, depression, PTSH who presented to ED due to worsening anxiety and inability to function. Medicine called to evaluate pt due to concern for facial swelling that began yesterday. Per RN, face is more swollen than yesterday. Also noted some hand swelling. Pt states that her LE are swollen at baseline. No fevers, chills, N/V/D, dysuria, or hematuria. No dysphagia. Does state her tongue feels "tingly."          PAST MEDICAL & SURGICAL HISTORY:      Review of Systems:   CONSTITUTIONAL: No fever, weight loss, or fatigue  EYES: No eye pain, visual disturbances, or discharge  ENMT:  No difficulty hearing, tinnitus, vertigo; No sinus or throat pain  NECK: No pain or stiffness  RESPIRATORY: No cough, wheezing, chills or hemoptysis; No shortness of breath  CARDIOVASCULAR: No chest pain, palpitations, dizziness, or leg swelling  GASTROINTESTINAL: No abdominal or epigastric pain. No nausea, vomiting, or hematemesis; No diarrhea or constipation. No melena or hematochezia.  GENITOURINARY: No dysuria, frequency, hematuria, or incontinence  NEUROLOGICAL: No headaches, memory loss, loss of strength, numbness, or tremors  SKIN: No itching, burning, rashes, or lesions   LYMPH NODES: No enlarged glands  ENDOCRINE: No heat or cold intolerance; No hair loss  MUSCULOSKELETAL: No joint pain or swelling; No muscle, back, or extremity pain  HEME/LYMPH: No easy bruising, or bleeding gums  ALLERGY AND IMMUNOLOGIC: No hives or eczema    Allergies    IV Contrast (Anaphylaxis)  Soy (Stomach Upset)    Intolerances        Social History:   Pt works as . Lives alone. Does not have   FAMILY HISTORY:      MEDICATIONS  (STANDING):  paliperidone ER 4.5 milliGRAM(s) Oral at bedtime    MEDICATIONS  (PRN):  acetaminophen     Tablet .. 650 milliGRAM(s) Oral every 6 hours PRN Mild Pain (1 - 3), Moderate Pain (4 - 6)  hydrOXYzine hydrochloride 50 milliGRAM(s) Oral every 8 hours PRN anxiety  LORazepam     Tablet 2 milliGRAM(s) Oral every 6 hours PRN agitation  LORazepam   Injectable 2 milliGRAM(s) IntraMuscular once PRN severe agitation  LORazepam   Injectable 2 milliGRAM(s) IntraMuscular once PRN Catatonia  melatonin. 3 milliGRAM(s) Oral at bedtime PRN Insomnia  OLANZapine Injectable 5 milliGRAM(s) IntraMuscular once PRN TOO court order for psychosis      Vital Signs Last 24 Hrs  T(C): 36.4 (29 Apr 2025 08:59), Max: 36.4 (29 Apr 2025 08:59)  T(F): 97.6 (29 Apr 2025 08:59), Max: 97.6 (29 Apr 2025 08:59)  HR: 96 (29 Apr 2025 08:59) (96 - 96)  BP: 112/66 (29 Apr 2025 08:59) (112/66 - 112/66)  BP(mean): --  RR: --  SpO2: --      CAPILLARY BLOOD GLUCOSE            PHYSICAL EXAM:  GENERAL: NAD, well-developed  HEAD:  Atraumatic, Normocephalic  EYES: EOMI, conjunctiva and sclera clear  NECK: Supple, No JVD  CHEST/LUNG: Clear to auscultation bilaterally; No wheeze  HEART: Regular rate and rhythm; No murmurs, rubs, or gallops  ABDOMEN: Soft, Nontender, Nondistended; Bowel sounds present  EXTREMITIES:  2+ Peripheral Pulses, No clubbing, cyanosis, or edema  NEUROLOGY: non-focal  SKIN: No rashes or lesions    LABS:                        12.7   4.45  )-----------( 316      ( 29 Apr 2025 12:31 )             38.3     04-29    143  |  109[H]  |  6[L]  ----------------------------<  82  4.8   |  23  |  0.77    Ca    9.5      29 Apr 2025 12:31  Phos  2.4     04-29  Mg     1.80     04-29    TPro  6.5  /  Alb  3.5  /  TBili  0.3  /  DBili  x   /  AST  18  /  ALT  18  /  AlkPhos  37[L]  04-29          Urinalysis Basic - ( 29 Apr 2025 12:31 )    Color: x / Appearance: x / SG: x / pH: x  Gluc: 82 mg/dL / Ketone: x  / Bili: x / Urobili: x   Blood: x / Protein: x / Nitrite: x   Leuk Esterase: x / RBC: x / WBC x   Sq Epi: x / Non Sq Epi: x / Bacteria: x        EKG(personally reviewed):    RADIOLOGY & ADDITIONAL TESTS:    Imaging Personally Reviewed:    Consultant(s) Notes Reviewed:      Care Discussed with Consultants/Other Providers:

## 2025-04-29 NOTE — CONSULT NOTE ADULT - ASSESSMENT
34 y.o. F with PMH of HTN, migraines, asthma, anxiety, depression, PTSH who presented to ED due to worsening anxiety and inability to function. Medicine called to evaluate pt due to concern for facial swelling that began yesterday. Per RN, face is more swollen than yesterday. Also noted some hand swelling. Noted to have some periorbital edema.       #Periorbital edema:   -no lip or tongue swelling. Pt is protecting airway. No dysphagia or difficulty with secretions  -Given localized periorbital edema, doubt allergic reaction  -no eosinophils on CBC  -order u/a, prot/cr ratio for concern for possible nephrotic syndrome    #Hypophosphatemia:   -replete    #Anxiety:   -management as per psych

## 2025-04-30 PROCEDURE — 99232 SBSQ HOSP IP/OBS MODERATE 35: CPT

## 2025-04-30 RX ORDER — OLANZAPINE 10 MG/1
5 TABLET ORAL AT BEDTIME
Refills: 0 | Status: DISCONTINUED | OUTPATIENT
Start: 2025-04-30 | End: 2025-05-16

## 2025-04-30 RX ORDER — OLANZAPINE 10 MG/1
2.5 TABLET ORAL ONCE
Refills: 0 | Status: COMPLETED | OUTPATIENT
Start: 2025-04-30 | End: 2025-04-30

## 2025-04-30 RX ORDER — OLANZAPINE 10 MG/1
2.5 TABLET ORAL DAILY
Refills: 0 | Status: DISCONTINUED | OUTPATIENT
Start: 2025-04-30 | End: 2025-05-09

## 2025-04-30 RX ORDER — OLANZAPINE 10 MG/1
2.5 TABLET ORAL DAILY
Refills: 0 | Status: DISCONTINUED | OUTPATIENT
Start: 2025-05-01 | End: 2025-05-06

## 2025-04-30 RX ORDER — OLANZAPINE 10 MG/1
5 TABLET ORAL AT BEDTIME
Refills: 0 | Status: DISCONTINUED | OUTPATIENT
Start: 2025-04-30 | End: 2025-05-05

## 2025-04-30 RX ADMIN — OLANZAPINE 2.5 MILLIGRAM(S): 10 TABLET ORAL at 14:03

## 2025-04-30 RX ADMIN — OLANZAPINE 5 MILLIGRAM(S): 10 TABLET ORAL at 21:52

## 2025-04-30 NOTE — BH INPATIENT PSYCHIATRY PROGRESS NOTE - CURRENT MEDICATION
MEDICATIONS  (STANDING):  OLANZapine 5 milliGRAM(s) Oral at bedtime    MEDICATIONS  (PRN):  acetaminophen     Tablet .. 650 milliGRAM(s) Oral every 6 hours PRN Mild Pain (1 - 3), Moderate Pain (4 - 6)  hydrOXYzine hydrochloride 50 milliGRAM(s) Oral every 8 hours PRN anxiety  LORazepam     Tablet 2 milliGRAM(s) Oral every 6 hours PRN agitation  LORazepam   Injectable 2 milliGRAM(s) IntraMuscular once PRN severe agitation  LORazepam   Injectable 2 milliGRAM(s) IntraMuscular once PRN Catatonia  melatonin. 3 milliGRAM(s) Oral at bedtime PRN Insomnia  OLANZapine Injectable 5 milliGRAM(s) IntraMuscular at bedtime PRN TOO  OLANZapine Injectable 2.5 milliGRAM(s) IntraMuscular daily PRN TOO

## 2025-04-30 NOTE — BH INPATIENT PSYCHIATRY PROGRESS NOTE - NSBHMETABOLIC_PSY_ALL_CORE_FT
BMI: BMI (kg/m2): 27 (04-14-25 @ 13:38)  HbA1c: A1C with Estimated Average Glucose Result: 5.2 % (04-23-25 @ 12:32)    Glucose:   BP: 108/74 (04-30-25 @ 09:51) (108/74 - 127/86)Vital Signs Last 24 Hrs  T(C): 36.6 (04-30-25 @ 09:51), Max: 36.6 (04-30-25 @ 09:51)  T(F): 97.8 (04-30-25 @ 09:51), Max: 97.8 (04-30-25 @ 09:51)  HR: 88 (04-30-25 @ 09:51) (88 - 88)  BP: 108/74 (04-30-25 @ 09:51) (108/74 - 108/74)  BP(mean): --  RR: --  SpO2: --      Lipid Panel: Date/Time: 04-23-25 @ 12:32  Cholesterol, Serum: 207  LDL Cholesterol Calculated: 138  HDL Cholesterol, Serum: 56  Total Cholesterol/HDL Ration Measurement: --  Triglycerides, Serum: 76

## 2025-04-30 NOTE — BH INPATIENT PSYCHIATRY PROGRESS NOTE - NSBHCHARTREVIEWVS_PSY_A_CORE FT
Vital Signs Last 24 Hrs  T(C): 36.6 (04-30-25 @ 09:51), Max: 36.6 (04-30-25 @ 09:51)  T(F): 97.8 (04-30-25 @ 09:51), Max: 97.8 (04-30-25 @ 09:51)  HR: 88 (04-30-25 @ 09:51) (88 - 88)  BP: 108/74 (04-30-25 @ 09:51) (108/74 - 108/74)  BP(mean): --  RR: --  SpO2: --

## 2025-05-01 LAB
ALBUMIN SERPL ELPH-MCNC: 3.2 G/DL — LOW (ref 3.3–5)
ALP SERPL-CCNC: 33 U/L — LOW (ref 40–120)
ALT FLD-CCNC: 18 U/L — SIGNIFICANT CHANGE UP (ref 4–33)
ANION GAP SERPL CALC-SCNC: 8 MMOL/L — SIGNIFICANT CHANGE UP (ref 7–14)
AST SERPL-CCNC: 22 U/L — SIGNIFICANT CHANGE UP (ref 4–32)
BASOPHILS # BLD AUTO: 0.03 K/UL — SIGNIFICANT CHANGE UP (ref 0–0.2)
BASOPHILS NFR BLD AUTO: 0.6 % — SIGNIFICANT CHANGE UP (ref 0–2)
BILIRUB SERPL-MCNC: 0.2 MG/DL — SIGNIFICANT CHANGE UP (ref 0.2–1.2)
BUN SERPL-MCNC: 11 MG/DL — SIGNIFICANT CHANGE UP (ref 7–23)
CALCIUM SERPL-MCNC: 9 MG/DL — SIGNIFICANT CHANGE UP (ref 8.4–10.5)
CHLORIDE SERPL-SCNC: 109 MMOL/L — HIGH (ref 98–107)
CO2 SERPL-SCNC: 24 MMOL/L — SIGNIFICANT CHANGE UP (ref 22–31)
CREAT SERPL-MCNC: 0.82 MG/DL — SIGNIFICANT CHANGE UP (ref 0.5–1.3)
EGFR: 95 ML/MIN/1.73M2 — SIGNIFICANT CHANGE UP
EGFR: 95 ML/MIN/1.73M2 — SIGNIFICANT CHANGE UP
EOSINOPHIL # BLD AUTO: 0.08 K/UL — SIGNIFICANT CHANGE UP (ref 0–0.5)
EOSINOPHIL NFR BLD AUTO: 1.7 % — SIGNIFICANT CHANGE UP (ref 0–6)
GLUCOSE SERPL-MCNC: 73 MG/DL — SIGNIFICANT CHANGE UP (ref 70–99)
HCT VFR BLD CALC: 34.9 % — SIGNIFICANT CHANGE UP (ref 34.5–45)
HGB BLD-MCNC: 11.4 G/DL — LOW (ref 11.5–15.5)
IANC: 1.2 K/UL — LOW (ref 1.8–7.4)
IMM GRANULOCYTES NFR BLD AUTO: 0.2 % — SIGNIFICANT CHANGE UP (ref 0–0.9)
LYMPHOCYTES # BLD AUTO: 3.17 K/UL — SIGNIFICANT CHANGE UP (ref 1–3.3)
LYMPHOCYTES # BLD AUTO: 65.8 % — HIGH (ref 13–44)
MCHC RBC-ENTMCNC: 29.7 PG — SIGNIFICANT CHANGE UP (ref 27–34)
MCHC RBC-ENTMCNC: 32.7 G/DL — SIGNIFICANT CHANGE UP (ref 32–36)
MCV RBC AUTO: 90.9 FL — SIGNIFICANT CHANGE UP (ref 80–100)
MONOCYTES # BLD AUTO: 0.33 K/UL — SIGNIFICANT CHANGE UP (ref 0–0.9)
MONOCYTES NFR BLD AUTO: 6.8 % — SIGNIFICANT CHANGE UP (ref 2–14)
NEUTROPHILS # BLD AUTO: 1.2 K/UL — LOW (ref 1.8–7.4)
NEUTROPHILS NFR BLD AUTO: 24.9 % — LOW (ref 43–77)
NRBC # BLD AUTO: 0 K/UL — SIGNIFICANT CHANGE UP (ref 0–0)
NRBC # FLD: 0 K/UL — SIGNIFICANT CHANGE UP (ref 0–0)
NRBC BLD AUTO-RTO: 0 /100 WBCS — SIGNIFICANT CHANGE UP (ref 0–0)
PLATELET # BLD AUTO: 346 K/UL — SIGNIFICANT CHANGE UP (ref 150–400)
POTASSIUM SERPL-MCNC: 4.1 MMOL/L — SIGNIFICANT CHANGE UP (ref 3.5–5.3)
POTASSIUM SERPL-SCNC: 4.1 MMOL/L — SIGNIFICANT CHANGE UP (ref 3.5–5.3)
PROT SERPL-MCNC: 6.3 G/DL — SIGNIFICANT CHANGE UP (ref 6–8.3)
RBC # BLD: 3.84 M/UL — SIGNIFICANT CHANGE UP (ref 3.8–5.2)
RBC # FLD: 14.2 % — SIGNIFICANT CHANGE UP (ref 10.3–14.5)
SODIUM SERPL-SCNC: 141 MMOL/L — SIGNIFICANT CHANGE UP (ref 135–145)
WBC # BLD: 4.82 K/UL — SIGNIFICANT CHANGE UP (ref 3.8–10.5)
WBC # FLD AUTO: 4.82 K/UL — SIGNIFICANT CHANGE UP (ref 3.8–10.5)

## 2025-05-01 PROCEDURE — 99232 SBSQ HOSP IP/OBS MODERATE 35: CPT

## 2025-05-01 RX ADMIN — OLANZAPINE 5 MILLIGRAM(S): 10 TABLET ORAL at 22:00

## 2025-05-01 NOTE — BH INPATIENT PSYCHIATRY PROGRESS NOTE - CURRENT MEDICATION
MEDICATIONS  (STANDING):  OLANZapine 2.5 milliGRAM(s) Oral daily  OLANZapine 5 milliGRAM(s) Oral at bedtime    MEDICATIONS  (PRN):  acetaminophen     Tablet .. 650 milliGRAM(s) Oral every 6 hours PRN Mild Pain (1 - 3), Moderate Pain (4 - 6)  hydrOXYzine hydrochloride 50 milliGRAM(s) Oral every 8 hours PRN anxiety  LORazepam     Tablet 2 milliGRAM(s) Oral every 6 hours PRN agitation  LORazepam   Injectable 2 milliGRAM(s) IntraMuscular once PRN severe agitation  LORazepam   Injectable 2 milliGRAM(s) IntraMuscular once PRN Catatonia  melatonin. 3 milliGRAM(s) Oral at bedtime PRN Insomnia  OLANZapine Injectable 5 milliGRAM(s) IntraMuscular at bedtime PRN TOO  OLANZapine Injectable 2.5 milliGRAM(s) IntraMuscular daily PRN TOO

## 2025-05-01 NOTE — BH INPATIENT PSYCHIATRY PROGRESS NOTE - NSBHMETABOLIC_PSY_ALL_CORE_FT
BMI: BMI (kg/m2): 27 (04-14-25 @ 13:38)  HbA1c: A1C with Estimated Average Glucose Result: 5.2 % (04-23-25 @ 12:32)    Glucose:   BP: 108/74 (04-30-25 @ 09:51) (108/74 - 112/66)Vital Signs Last 24 Hrs  T(C): --  T(F): --  HR: --  BP: --  BP(mean): --  RR: --  SpO2: --      Lipid Panel: Date/Time: 04-23-25 @ 12:32  Cholesterol, Serum: 207  LDL Cholesterol Calculated: 138  HDL Cholesterol, Serum: 56  Total Cholesterol/HDL Ration Measurement: --  Triglycerides, Serum: 76   BMI: BMI (kg/m2): 27 (04-14-25 @ 13:38)  HbA1c: A1C with Estimated Average Glucose Result: 5.2 % (04-23-25 @ 12:32)    Glucose:   BP: 119/84 (05-01-25 @ 18:58) (108/74 - 119/84)Vital Signs Last 24 Hrs  T(C): --  T(F): --  HR: --  BP: 119/84 (05-01-25 @ 18:58) (119/84 - 119/84)  BP(mean): --  RR: 20 (05-01-25 @ 18:58) (20 - 20)  SpO2: 100% (05-01-25 @ 18:58) (100% - 100%)      Lipid Panel: Date/Time: 04-23-25 @ 12:32  Cholesterol, Serum: 207  LDL Cholesterol Calculated: 138  HDL Cholesterol, Serum: 56  Total Cholesterol/HDL Ration Measurement: --  Triglycerides, Serum: 76

## 2025-05-01 NOTE — BH INPATIENT PSYCHIATRY PROGRESS NOTE - ATTENDING COMMENTS
Care was discussed and reviewed in the interdisciplinary treatment team.  I, Marielle Cook MD, have reviewed and verified the documentation.   Care was discussed and reviewed in the interdisciplinary treatment team.  I, Marielle Cook MD, have reviewed and verified the documentation.    Patient was evaluated by this writer earlier this morning. It was notice that the left side of her face was swollen, yesterday this was not present. Patient said that this always happens with antipsychotics. I held the Zyprexa and asked the NP to review the situation with the medical team.

## 2025-05-01 NOTE — BH INPATIENT PSYCHIATRY PROGRESS NOTE - NSBHCHARTREVIEWVS_PSY_A_CORE FT
Vital Signs Last 24 Hrs  T(C): --  T(F): --  HR: --  BP: --  BP(mean): --  RR: --  SpO2: --     Vital Signs Last 24 Hrs  T(C): --  T(F): --  HR: --  BP: 119/84 (05-01-25 @ 18:58) (119/84 - 119/84)  BP(mean): --  RR: 20 (05-01-25 @ 18:58) (20 - 20)  SpO2: 100% (05-01-25 @ 18:58) (100% - 100%)

## 2025-05-02 LAB
AMPHET UR-MCNC: NEGATIVE — SIGNIFICANT CHANGE UP
APPEARANCE UR: CLEAR — SIGNIFICANT CHANGE UP
BACTERIA # UR AUTO: NEGATIVE /HPF — SIGNIFICANT CHANGE UP
BARBITURATES UR SCN-MCNC: NEGATIVE — SIGNIFICANT CHANGE UP
BENZODIAZ UR-MCNC: NEGATIVE — SIGNIFICANT CHANGE UP
BILIRUB UR-MCNC: NEGATIVE — SIGNIFICANT CHANGE UP
CAST: 0 /LPF — SIGNIFICANT CHANGE UP (ref 0–4)
COCAINE METAB.OTHER UR-MCNC: NEGATIVE — SIGNIFICANT CHANGE UP
COLOR SPEC: YELLOW — SIGNIFICANT CHANGE UP
CREAT ?TM UR-MCNC: 82 MG/DL — SIGNIFICANT CHANGE UP
CREATININE URINE RESULT, DAU: 83 MG/DL — SIGNIFICANT CHANGE UP
DIFF PNL FLD: NEGATIVE — SIGNIFICANT CHANGE UP
FENTANYL UR QL SCN: NEGATIVE — SIGNIFICANT CHANGE UP
GLUCOSE UR QL: NEGATIVE MG/DL — SIGNIFICANT CHANGE UP
KETONES UR-MCNC: NEGATIVE MG/DL — SIGNIFICANT CHANGE UP
LEUKOCYTE ESTERASE UR-ACNC: NEGATIVE — SIGNIFICANT CHANGE UP
METHADONE UR-MCNC: NEGATIVE — SIGNIFICANT CHANGE UP
NITRITE UR-MCNC: NEGATIVE — SIGNIFICANT CHANGE UP
OPIATES UR-MCNC: NEGATIVE — SIGNIFICANT CHANGE UP
OXYCODONE UR-MCNC: NEGATIVE — SIGNIFICANT CHANGE UP
PCP SPEC-MCNC: SIGNIFICANT CHANGE UP
PCP UR-MCNC: NEGATIVE — SIGNIFICANT CHANGE UP
PH UR: 6.5 — SIGNIFICANT CHANGE UP (ref 5–8)
PROT ?TM UR-MCNC: 5 MG/DL — SIGNIFICANT CHANGE UP
PROT UR-MCNC: NEGATIVE MG/DL — SIGNIFICANT CHANGE UP
PROT/CREAT UR-RTO: 0.1 RATIO — SIGNIFICANT CHANGE UP (ref 0–0.2)
RBC CASTS # UR COMP ASSIST: 0 /HPF — SIGNIFICANT CHANGE UP (ref 0–4)
SP GR SPEC: 1.01 — SIGNIFICANT CHANGE UP (ref 1–1.03)
SQUAMOUS # UR AUTO: 0 /HPF — SIGNIFICANT CHANGE UP (ref 0–5)
THC UR QL: NEGATIVE — SIGNIFICANT CHANGE UP
UROBILINOGEN FLD QL: 0.2 MG/DL — SIGNIFICANT CHANGE UP (ref 0.2–1)
WBC UR QL: 1 /HPF — SIGNIFICANT CHANGE UP (ref 0–5)

## 2025-05-02 PROCEDURE — 99232 SBSQ HOSP IP/OBS MODERATE 35: CPT

## 2025-05-02 PROCEDURE — 99233 SBSQ HOSP IP/OBS HIGH 50: CPT

## 2025-05-02 RX ORDER — MAGNESIUM, ALUMINUM HYDROXIDE 200-200 MG
30 TABLET,CHEWABLE ORAL EVERY 6 HOURS
Refills: 0 | Status: DISCONTINUED | OUTPATIENT
Start: 2025-05-02 | End: 2025-05-16

## 2025-05-02 RX ORDER — MAGNESIUM HYDROXIDE 400 MG/5ML
30 SUSPENSION ORAL DAILY
Refills: 0 | Status: DISCONTINUED | OUTPATIENT
Start: 2025-05-02 | End: 2025-05-16

## 2025-05-02 RX ORDER — FLUTICASONE PROPIONATE 50 UG/1
1 SPRAY, METERED NASAL
Refills: 0 | Status: DISCONTINUED | OUTPATIENT
Start: 2025-05-02 | End: 2025-05-16

## 2025-05-02 RX ADMIN — OLANZAPINE 2.5 MILLIGRAM(S): 10 TABLET ORAL at 12:18

## 2025-05-02 RX ADMIN — OLANZAPINE 5 MILLIGRAM(S): 10 TABLET ORAL at 20:52

## 2025-05-02 NOTE — BH INPATIENT PSYCHIATRY PROGRESS NOTE - NSBHCHARTREVIEWVS_PSY_A_CORE FT
Vital Signs Last 24 Hrs  T(C): 36.8 (05-02-25 @ 09:54), Max: 36.8 (05-02-25 @ 09:54)  T(F): 98.2 (05-02-25 @ 09:54), Max: 98.2 (05-02-25 @ 09:54)  HR: --  BP: 119/84 (05-01-25 @ 18:58) (119/84 - 119/84)  BP(mean): --  RR: 18 (05-02-25 @ 09:54) (18 - 20)  SpO2: 100% (05-01-25 @ 18:58) (100% - 100%)    Orthostatic VS  05-02-25 @ 09:54  Lying BP: 100/54 HR: 100  Sitting BP: 104/70 HR: 93  Standing BP: --/-- HR: --  Site: --  Mode: --   Vital Signs Last 24 Hrs  T(C): 36.8 (05-02-25 @ 09:54), Max: 36.8 (05-02-25 @ 09:54)  T(F): 98.2 (05-02-25 @ 09:54), Max: 98.2 (05-02-25 @ 09:54)  HR: --  BP: --  BP(mean): --  RR: 18 (05-02-25 @ 09:54) (18 - 18)  SpO2: --    Orthostatic VS  05-02-25 @ 09:54  Lying BP: 100/54 HR: 100  Sitting BP: 104/70 HR: 93  Standing BP: --/-- HR: --  Site: --  Mode: --

## 2025-05-02 NOTE — BH INPATIENT PSYCHIATRY PROGRESS NOTE - NSBHMETABOLIC_PSY_ALL_CORE_FT
BMI: BMI (kg/m2): 27 (04-14-25 @ 13:38)  HbA1c: A1C with Estimated Average Glucose Result: 5.2 % (04-23-25 @ 12:32)    Glucose:   BP: 119/84 (05-01-25 @ 18:58) (108/74 - 119/84)Vital Signs Last 24 Hrs  T(C): 36.8 (05-02-25 @ 09:54), Max: 36.8 (05-02-25 @ 09:54)  T(F): 98.2 (05-02-25 @ 09:54), Max: 98.2 (05-02-25 @ 09:54)  HR: --  BP: 119/84 (05-01-25 @ 18:58) (119/84 - 119/84)  BP(mean): --  RR: 18 (05-02-25 @ 09:54) (18 - 20)  SpO2: 100% (05-01-25 @ 18:58) (100% - 100%)    Orthostatic VS  05-02-25 @ 09:54  Lying BP: 100/54 HR: 100  Sitting BP: 104/70 HR: 93  Standing BP: --/-- HR: --  Site: --  Mode: --    Lipid Panel: Date/Time: 04-23-25 @ 12:32  Cholesterol, Serum: 207  LDL Cholesterol Calculated: 138  HDL Cholesterol, Serum: 56  Total Cholesterol/HDL Ration Measurement: --  Triglycerides, Serum: 76   BMI: BMI (kg/m2): 27 (04-14-25 @ 13:38)  HbA1c: A1C with Estimated Average Glucose Result: 5.2 % (04-23-25 @ 12:32)    Glucose:   BP: 119/84 (05-01-25 @ 18:58) (108/74 - 119/84)Vital Signs Last 24 Hrs  T(C): 36.8 (05-02-25 @ 09:54), Max: 36.8 (05-02-25 @ 09:54)  T(F): 98.2 (05-02-25 @ 09:54), Max: 98.2 (05-02-25 @ 09:54)  HR: --  BP: --  BP(mean): --  RR: 18 (05-02-25 @ 09:54) (18 - 18)  SpO2: --    Orthostatic VS  05-02-25 @ 09:54  Lying BP: 100/54 HR: 100  Sitting BP: 104/70 HR: 93  Standing BP: --/-- HR: --  Site: --  Mode: --    Lipid Panel: Date/Time: 04-23-25 @ 12:32  Cholesterol, Serum: 207  LDL Cholesterol Calculated: 138  HDL Cholesterol, Serum: 56  Total Cholesterol/HDL Ration Measurement: --  Triglycerides, Serum: 76

## 2025-05-02 NOTE — BH INPATIENT PSYCHIATRY PROGRESS NOTE - CURRENT MEDICATION
MEDICATIONS  (STANDING):  fluticasone propionate 50 MICROgram(s)/spray Nasal Spray 1 Spray(s) Both Nostrils two times a day  OLANZapine 2.5 milliGRAM(s) Oral daily  OLANZapine 5 milliGRAM(s) Oral at bedtime    MEDICATIONS  (PRN):  acetaminophen     Tablet .. 650 milliGRAM(s) Oral every 6 hours PRN Mild Pain (1 - 3), Moderate Pain (4 - 6)  aluminum hydroxide/magnesium hydroxide/simethicone Suspension 30 milliLiter(s) Oral every 6 hours PRN Dyspepsia  hydrOXYzine hydrochloride 50 milliGRAM(s) Oral every 8 hours PRN anxiety  LORazepam     Tablet 2 milliGRAM(s) Oral every 6 hours PRN agitation  LORazepam   Injectable 2 milliGRAM(s) IntraMuscular once PRN severe agitation  LORazepam   Injectable 2 milliGRAM(s) IntraMuscular once PRN Catatonia  magnesium hydroxide Suspension 30 milliLiter(s) Oral daily PRN Constipation  melatonin. 3 milliGRAM(s) Oral at bedtime PRN Insomnia  OLANZapine Injectable 5 milliGRAM(s) IntraMuscular at bedtime PRN TOO  OLANZapine Injectable 2.5 milliGRAM(s) IntraMuscular daily PRN TOO

## 2025-05-02 NOTE — BH INPATIENT PSYCHIATRY PROGRESS NOTE - ATTENDING COMMENTS
Care was discussed and reviewed in the interdisciplinary treatment team.  I, Marielle Cook MD, have reviewed and verified the documentation.

## 2025-05-03 RX ORDER — BISMUTH SUBSALICYLATE 262 MG/1
20 TABLET, CHEWABLE ORAL ONCE
Refills: 0 | Status: COMPLETED | OUTPATIENT
Start: 2025-05-03 | End: 2025-05-03

## 2025-05-03 RX ADMIN — OLANZAPINE 5 MILLIGRAM(S): 10 TABLET ORAL at 21:15

## 2025-05-03 RX ADMIN — BISMUTH SUBSALICYLATE 20 MILLILITER(S): 262 TABLET, CHEWABLE ORAL at 21:12

## 2025-05-03 RX ADMIN — OLANZAPINE 2.5 MILLIGRAM(S): 10 TABLET ORAL at 08:45

## 2025-05-04 RX ADMIN — OLANZAPINE 2.5 MILLIGRAM(S): 10 TABLET ORAL at 08:42

## 2025-05-04 RX ADMIN — FLUTICASONE PROPIONATE 1 SPRAY(S): 50 SPRAY, METERED NASAL at 08:48

## 2025-05-04 RX ADMIN — OLANZAPINE 5 MILLIGRAM(S): 10 TABLET ORAL at 21:23

## 2025-05-05 PROCEDURE — 99232 SBSQ HOSP IP/OBS MODERATE 35: CPT

## 2025-05-05 RX ORDER — LORAZEPAM 4 MG/ML
2 VIAL (ML) INJECTION ONCE
Refills: 0 | Status: DISCONTINUED | OUTPATIENT
Start: 2025-05-05 | End: 2025-05-12

## 2025-05-05 RX ORDER — OLANZAPINE 10 MG/1
7.5 TABLET ORAL AT BEDTIME
Refills: 0 | Status: DISCONTINUED | OUTPATIENT
Start: 2025-05-05 | End: 2025-05-06

## 2025-05-05 RX ORDER — LORAZEPAM 4 MG/ML
2 VIAL (ML) INJECTION EVERY 6 HOURS
Refills: 0 | Status: DISCONTINUED | OUTPATIENT
Start: 2025-05-05 | End: 2025-05-12

## 2025-05-05 RX ADMIN — Medication 30 MILLILITER(S): at 09:43

## 2025-05-05 RX ADMIN — OLANZAPINE 2.5 MILLIGRAM(S): 10 TABLET ORAL at 09:42

## 2025-05-05 RX ADMIN — OLANZAPINE 7.5 MILLIGRAM(S): 10 TABLET ORAL at 21:45

## 2025-05-05 NOTE — BH INPATIENT PSYCHIATRY PROGRESS NOTE - NSBHCHARTREVIEWVS_PSY_A_CORE FT
Vital Signs Last 24 Hrs  T(C): 36.8 (05-05-25 @ 09:11), Max: 36.8 (05-05-25 @ 09:11)  T(F): 98.2 (05-05-25 @ 09:11), Max: 98.2 (05-05-25 @ 09:11)  HR: 82 (05-05-25 @ 09:11) (82 - 82)  BP: 124/79 (05-05-25 @ 09:11) (124/79 - 124/79)  BP(mean): --  RR: 18 (05-05-25 @ 09:11) (18 - 18)  SpO2: --    Orthostatic VS  05-04-25 @ 18:38  Lying BP: --/-- HR: --  Sitting BP: 117/81 HR: 90  Standing BP: 113/83 HR: 105  Site: --  Mode: --

## 2025-05-05 NOTE — PROGRESS NOTE ADULT - ASSESSMENT
34 y.o. F with PMH of HTN, migraines, asthma, anxiety, depression, PTSH who presented to ED due to worsening anxiety and inability to function. Medicine called to evaluate pt due to concern for facial swelling that began yesterday. Per RN, face is more swollen than yesterday. Also noted some hand swelling. Noted to have some periorbital edema.       #Periorbital edema:   -no lip or tongue swelling. Pt is protecting airway. No dysphagia or difficulty with secretions  -Given localized periorbital edema, doubt allergic reaction. Still, Invega d/lizett.   -no eosinophils on CBC  -order u/a, prot/cr ratio for concern for possible nephrotic syndrome. Still not collected. Discussed the importance of providing sample to pt     #Hypophosphatemia:   -replete    #Anxiety:   -management as per psych
34 y.o. F with PMH of HTN, migraines, asthma, anxiety, depression, PTSH who presented to ED due to worsening anxiety and inability to function. Medicine called to evaluate pt due to concern for facial swelling that began yesterday. Per RN, face is more swollen than yesterday. Also noted some hand swelling. Noted to have some periorbital edema. Now noted more notable on R side.     5/5/25: swelling RESOLVED.     #Periorbital edema: RESOLVED   -now noted more pronouncedly on R side  -pain over R maxillary sinus; possible chronic sinusitis? Will initiate flomax  -also on differential is odontogenic infection. If continues, would recommend dental evaluation  -no lip or tongue swelling. Pt is protecting airway. No dysphagia or difficulty with secretions  -Given localized periorbital edema, doubt allergic reaction. Still, Invega d/lizett.   -no eosinophils on CBC  -u/a without proteinuria; no concern for nephrotic syndrome at this time    #Hypophosphatemia:   -replete    #Anxiety:   -management as per psych
34 y.o. F with PMH of HTN, migraines, asthma, anxiety, depression, PTSH who presented to ED due to worsening anxiety and inability to function. Medicine called to evaluate pt due to concern for facial swelling that began yesterday. Per RN, face is more swollen than yesterday. Also noted some hand swelling. Noted to have some periorbital edema. Now noted more notable on R side.       #Periorbital edema:   -now noted more pronouncedly on R side  -pain over R maxillary sinus; possible chronic sinusitis? Will initiate flomax  -also on differential is odontogenic infection. If continues, would recommend dental evaluation  -no lip or tongue swelling. Pt is protecting airway. No dysphagia or difficulty with secretions  -Given localized periorbital edema, doubt allergic reaction. Still, Invega d/lizett.   -no eosinophils on CBC  -u/a without proteinuria; no concern for nephrotic syndrome at this time    #Hypophosphatemia:   -replete    #Anxiety:   -management as per psych

## 2025-05-05 NOTE — BH INPATIENT PSYCHIATRY PROGRESS NOTE - NSBHMETABOLIC_PSY_ALL_CORE_FT
BMI: BMI (kg/m2): 28.4 (05-03-25 @ 08:11)  HbA1c: A1C with Estimated Average Glucose Result: 5.2 % (04-23-25 @ 12:32)    Glucose:   BP: 124/79 (05-05-25 @ 09:11) (124/79 - 124/79)Vital Signs Last 24 Hrs  T(C): 36.8 (05-05-25 @ 09:11), Max: 36.8 (05-05-25 @ 09:11)  T(F): 98.2 (05-05-25 @ 09:11), Max: 98.2 (05-05-25 @ 09:11)  HR: 82 (05-05-25 @ 09:11) (82 - 82)  BP: 124/79 (05-05-25 @ 09:11) (124/79 - 124/79)  BP(mean): --  RR: 18 (05-05-25 @ 09:11) (18 - 18)  SpO2: --    Orthostatic VS  05-04-25 @ 18:38  Lying BP: --/-- HR: --  Sitting BP: 117/81 HR: 90  Standing BP: 113/83 HR: 105  Site: --  Mode: --    Lipid Panel: Date/Time: 04-23-25 @ 12:32  Cholesterol, Serum: 207  LDL Cholesterol Calculated: 138  HDL Cholesterol, Serum: 56  Total Cholesterol/HDL Ration Measurement: --  Triglycerides, Serum: 76

## 2025-05-05 NOTE — PROGRESS NOTE ADULT - SUBJECTIVE AND OBJECTIVE BOX
PROGRESS NOTE:   Authored by Dr. Renu Marcus MD, pager [phone]     Patient is a 35y old  Female who presents with a chief complaint of Bipolar disorder, current episode depressed, severe, with psychotic features     (15 Apr 2025 14:57)      SUBJECTIVE / OVERNIGHT EVENTS:  Pt is sitting up in bed. Doing well. No fevers, chills, N/V/D, dysuria, or hemauri    ADDITIONAL REVIEW OF SYSTEMS:    MEDICATIONS  (STANDING):  OLANZapine 5 milliGRAM(s) Oral at bedtime    MEDICATIONS  (PRN):  acetaminophen     Tablet .. 650 milliGRAM(s) Oral every 6 hours PRN Mild Pain (1 - 3), Moderate Pain (4 - 6)  hydrOXYzine hydrochloride 50 milliGRAM(s) Oral every 8 hours PRN anxiety  LORazepam     Tablet 2 milliGRAM(s) Oral every 6 hours PRN agitation  LORazepam   Injectable 2 milliGRAM(s) IntraMuscular once PRN severe agitation  LORazepam   Injectable 2 milliGRAM(s) IntraMuscular once PRN Catatonia  melatonin. 3 milliGRAM(s) Oral at bedtime PRN Insomnia  OLANZapine Injectable 5 milliGRAM(s) IntraMuscular at bedtime PRN TOO  OLANZapine Injectable 2.5 milliGRAM(s) IntraMuscular daily PRN TOO      CAPILLARY BLOOD GLUCOSE        I&O's Summary      PHYSICAL EXAM:  Vital Signs Last 24 Hrs  T(C): 36.6 (30 Apr 2025 09:51), Max: 36.6 (30 Apr 2025 09:51)  T(F): 97.8 (30 Apr 2025 09:51), Max: 97.8 (30 Apr 2025 09:51)  HR: 88 (30 Apr 2025 09:51) (88 - 88)  BP: 108/74 (30 Apr 2025 09:51) (108/74 - 108/74)  BP(mean): --  RR: --  SpO2: --        CONSTITUTIONAL: NAD, well-developed  RESPIRATORY: Normal respiratory effort; lungs are clear to auscultation bilaterally  CARDIOVASCULAR: Regular rate and rhythm, normal S1 and S2, no murmur/rub/gallop; No lower extremity edema; Peripheral pulses are 2+ bilaterally  ABDOMEN: Nontender to palpation, normoactive bowel sounds, no rebound/guarding; No hepatosplenomegaly  MUSCULOSKELETAL: no clubbing or cyanosis of digits; no joint swelling or tenderness to palpation  PSYCH: A+O to person, place, and time; affect appropriate    LABS:                        12.7   4.45  )-----------( 316      ( 29 Apr 2025 12:31 )             38.3     04-29    143  |  109[H]  |  6[L]  ----------------------------<  82  4.8   |  23  |  0.77    Ca    9.5      29 Apr 2025 12:31  Phos  2.4     04-29  Mg     1.80     04-29    TPro  6.5  /  Alb  3.5  /  TBili  0.3  /  DBili  x   /  AST  18  /  ALT  18  /  AlkPhos  37[L]  04-29          Urinalysis Basic - ( 29 Apr 2025 12:31 )    Color: x / Appearance: x / SG: x / pH: x  Gluc: 82 mg/dL / Ketone: x  / Bili: x / Urobili: x   Blood: x / Protein: x / Nitrite: x   Leuk Esterase: x / RBC: x / WBC x   Sq Epi: x / Non Sq Epi: x / Bacteria: x          RADIOLOGY & ADDITIONAL TESTS:  Results Reviewed:   Imaging Personally Reviewed:  Electrocardiogram Personally Reviewed:    COORDINATION OF CARE:  Care Discussed with Consultants/Other Providers [Y/N]:  Prior or Outpatient Records Reviewed [Y/N]:  
PROGRESS NOTE:   Authored by Dr. Renu Marcus MD, pager [phone]     Patient is a 35y old  Female who presents with a chief complaint of Bipolar disorder, current episode depressed, severe, with psychotic features     (15 Apr 2025 14:57)      SUBJECTIVE / OVERNIGHT EVENTS:  Pt is doing well. Continues to endorse facial swelling ,particularly on R side. Denies rhinorrhea, or sore throat. States has TMJ pain b/l. No difficulty swallowing; states her "tongue feels numb sometimes."     ADDITIONAL REVIEW OF SYSTEMS:    MEDICATIONS  (STANDING):  OLANZapine 2.5 milliGRAM(s) Oral daily  OLANZapine 5 milliGRAM(s) Oral at bedtime    MEDICATIONS  (PRN):  acetaminophen     Tablet .. 650 milliGRAM(s) Oral every 6 hours PRN Mild Pain (1 - 3), Moderate Pain (4 - 6)  aluminum hydroxide/magnesium hydroxide/simethicone Suspension 30 milliLiter(s) Oral every 6 hours PRN Dyspepsia  hydrOXYzine hydrochloride 50 milliGRAM(s) Oral every 8 hours PRN anxiety  LORazepam     Tablet 2 milliGRAM(s) Oral every 6 hours PRN agitation  LORazepam   Injectable 2 milliGRAM(s) IntraMuscular once PRN severe agitation  LORazepam   Injectable 2 milliGRAM(s) IntraMuscular once PRN Catatonia  magnesium hydroxide Suspension 30 milliLiter(s) Oral daily PRN Constipation  melatonin. 3 milliGRAM(s) Oral at bedtime PRN Insomnia  OLANZapine Injectable 5 milliGRAM(s) IntraMuscular at bedtime PRN TOO  OLANZapine Injectable 2.5 milliGRAM(s) IntraMuscular daily PRN TOO      CAPILLARY BLOOD GLUCOSE        I&O's Summary      PHYSICAL EXAM:  Vital Signs Last 24 Hrs  T(C): 36.8 (02 May 2025 09:54), Max: 36.8 (02 May 2025 09:54)  T(F): 98.2 (02 May 2025 09:54), Max: 98.2 (02 May 2025 09:54)  HR: --  BP: 119/84 (01 May 2025 18:58) (119/84 - 119/84)  BP(mean): --  RR: 18 (02 May 2025 09:54) (18 - 20)  SpO2: 100% (01 May 2025 18:58) (100% - 100%)    Parameters below as of 01 May 2025 18:58    O2 Flow (L/min): 85      CONSTITUTIONAL: NAD, well-developed  HEENT: Swelling and tenderness noted over R maxillary sinus  RESPIRATORY: Normal respiratory effort; lungs are clear to auscultation bilaterally  CARDIOVASCULAR: Regular rate and rhythm, normal S1 and S2, no murmur/rub/gallop; No lower extremity edema; Peripheral pulses are 2+ bilaterally  ABDOMEN: Nontender to palpation, normoactive bowel sounds, no rebound/guarding; No hepatosplenomegaly  MUSCULOSKELETAL: no clubbing or cyanosis of digits; no joint swelling or tenderness to palpation  PSYCH: A+O to person, place, and time; affect appropriate    LABS:                        11.4   4.82  )-----------( 346      ( 01 May 2025 10:29 )             34.9     05-    141  |  109[H]  |  11  ----------------------------<  73  4.1   |  24  |  0.82    Ca    9.0      01 May 2025 10:29    TPro  6.3  /  Alb  3.2[L]  /  TBili  0.2  /  DBili  x   /  AST  22  /  ALT  18  /  AlkPhos  33[L]  05-          Urinalysis Basic - ( 02 May 2025 10:20 )    Color: Yellow / Appearance: Clear / S.012 / pH: x  Gluc: x / Ketone: Negative mg/dL  / Bili: Negative / Urobili: 0.2 mg/dL   Blood: x / Protein: Negative mg/dL / Nitrite: Negative   Leuk Esterase: Negative / RBC: 0 /HPF / WBC 1 /HPF   Sq Epi: x / Non Sq Epi: 0 /HPF / Bacteria: Negative /HPF          RADIOLOGY & ADDITIONAL TESTS:  Results Reviewed:   Imaging Personally Reviewed:  Electrocardiogram Personally Reviewed:    COORDINATION OF CARE:  Care Discussed with Consultants/Other Providers [Y/N]:  Prior or Outpatient Records Reviewed [Y/N]:  
PROGRESS NOTE:   Authored by Dr. Renu Marcus MD, pager [phone]     Patient is a 35y old  Female who presents with a chief complaint of Bipolar disorder, current episode depressed, severe, with psychotic features     (15 Apr 2025 14:57)      SUBJECTIVE / OVERNIGHT EVENTS:  Pt is sitting in common area. Is unable to state whether she feels facial swelling has improved or not.     ADDITIONAL REVIEW OF SYSTEMS:    MEDICATIONS  (STANDING):  OLANZapine 5 milliGRAM(s) Oral at bedtime    MEDICATIONS  (PRN):  acetaminophen     Tablet .. 650 milliGRAM(s) Oral every 6 hours PRN Mild Pain (1 - 3), Moderate Pain (4 - 6)  hydrOXYzine hydrochloride 50 milliGRAM(s) Oral every 8 hours PRN anxiety  LORazepam     Tablet 2 milliGRAM(s) Oral every 6 hours PRN agitation  LORazepam   Injectable 2 milliGRAM(s) IntraMuscular once PRN severe agitation  LORazepam   Injectable 2 milliGRAM(s) IntraMuscular once PRN Catatonia  melatonin. 3 milliGRAM(s) Oral at bedtime PRN Insomnia  OLANZapine Injectable 5 milliGRAM(s) IntraMuscular at bedtime PRN TOO  OLANZapine Injectable 2.5 milliGRAM(s) IntraMuscular daily PRN TOO      CAPILLARY BLOOD GLUCOSE        I&O's Summary      PHYSICAL EXAM:  Vital Signs Last 24 Hrs  T(C): 36.6 (30 Apr 2025 09:51), Max: 36.6 (30 Apr 2025 09:51)  T(F): 97.8 (30 Apr 2025 09:51), Max: 97.8 (30 Apr 2025 09:51)  HR: 88 (30 Apr 2025 09:51) (88 - 88)  BP: 108/74 (30 Apr 2025 09:51) (108/74 - 108/74)  BP(mean): --  RR: --  SpO2: --    Vital Signs Last 24 Hrs  T(C): 36.6 (04-30-25 @ 09:51), Max: 36.6 (04-30-25 @ 09:51)  T(F): 97.8 (04-30-25 @ 09:51), Max: 97.8 (04-30-25 @ 09:51)  HR: 88 (04-30-25 @ 09:51) (88 - 88)  BP: 108/74 (04-30-25 @ 09:51) (108/74 - 108/74)  BP(mean): --  RR: --  SpO2: --          CONSTITUTIONAL: NAD, well-developed  HEENT: No tongue swelling, mild periorbital edema  RESPIRATORY: Normal respiratory effort; lungs are clear to auscultation bilaterally  CARDIOVASCULAR: Regular rate and rhythm, normal S1 and S2, no murmur/rub/gallop; No lower extremity edema; Peripheral pulses are 2+ bilaterally  ABDOMEN: Nontender to palpation, normoactive bowel sounds, no rebound/guarding; No hepatosplenomegaly  MUSCULOSKELETAL: no clubbing or cyanosis of digits; no joint swelling or tenderness to palpation  PSYCH: A+O to person, place, and time    LABS:                        12.7   4.45  )-----------( 316      ( 29 Apr 2025 12:31 )             38.3     04-29    143  |  109[H]  |  6[L]  ----------------------------<  82  4.8   |  23  |  0.77    Ca    9.5      29 Apr 2025 12:31  Phos  2.4     04-29  Mg     1.80     04-29    TPro  6.5  /  Alb  3.5  /  TBili  0.3  /  DBili  x   /  AST  18  /  ALT  18  /  AlkPhos  37[L]  04-29          Urinalysis Basic - ( 29 Apr 2025 12:31 )    Color: x / Appearance: x / SG: x / pH: x  Gluc: 82 mg/dL / Ketone: x  / Bili: x / Urobili: x   Blood: x / Protein: x / Nitrite: x   Leuk Esterase: x / RBC: x / WBC x   Sq Epi: x / Non Sq Epi: x / Bacteria: x          RADIOLOGY & ADDITIONAL TESTS:  Results Reviewed:   Imaging Personally Reviewed:  Electrocardiogram Personally Reviewed:    COORDINATION OF CARE:  Care Discussed with Consultants/Other Providers [Y/N]:  Prior or Outpatient Records Reviewed [Y/N]:  
PROGRESS NOTE:   Authored by Dr. Renu Marcus MD, pager [phone]     Patient is a 35y old  Female who presents with a chief complaint of Bipolar disorder, current episode depressed, severe, with psychotic features     (15 Apr 2025 14:57)      SUBJECTIVE / OVERNIGHT EVENTS:  Pt is doing well.  -  facial swelling resolved    Denies rhinorrhea, or sore throat. No fevers. no tooth ache, no earache. States has TMJ pain b/l,denies h/o difficulty swallowing    ADDITIONAL REVIEW OF SYSTEMS:     MEDICATIONS  (STANDING):  fluticasone propionate 50 MICROgram(s)/spray Nasal Spray 1 Spray(s) Both Nostrils two times a day  OLANZapine 2.5 milliGRAM(s) Oral daily  OLANZapine 5 milliGRAM(s) Oral at bedtime    MEDICATIONS  (PRN):  acetaminophen     Tablet .. 650 milliGRAM(s) Oral every 6 hours PRN Mild Pain (1 - 3), Moderate Pain (4 - 6)  aluminum hydroxide/magnesium hydroxide/simethicone Suspension 30 milliLiter(s) Oral every 6 hours PRN Dyspepsia  hydrOXYzine hydrochloride 50 milliGRAM(s) Oral every 8 hours PRN anxiety  LORazepam     Tablet 2 milliGRAM(s) Oral every 6 hours PRN agitation  LORazepam   Injectable 2 milliGRAM(s) IntraMuscular once PRN severe agitation  LORazepam   Injectable 2 milliGRAM(s) IntraMuscular once PRN Catatonia  magnesium hydroxide Suspension 30 milliLiter(s) Oral daily PRN Constipation  melatonin. 3 milliGRAM(s) Oral at bedtime PRN Insomnia  OLANZapine Injectable 5 milliGRAM(s) IntraMuscular at bedtime PRN TOO  OLANZapine Injectable 2.5 milliGRAM(s) IntraMuscular daily PRN TOO      CAPILLARY BLOOD GLUCOSE        I&O's Summary      Vital Signs Last 24 Hrs  T(C): 36.8 (05 May 2025 09:11), Max: 36.8 (05 May 2025 09:11)  T(F): 98.2 (05 May 2025 09:11), Max: 98.2 (05 May 2025 09:11)  HR: 82 (05 May 2025 09:11) (82 - 82)  BP: 124/79 (05 May 2025 09:11) (124/79 - 124/79)  BP(mean): --  RR: 18 (05 May 2025 09:11) (18 - 18)  SpO2: --      CONSTITUTIONAL: NAD, well-developed  HEENT: No facial swelling   RESPIRATORY: Normal respiratory effort; lungs are clear to auscultation bilaterally  CARDIOVASCULAR: Regular rate and rhythm, normal S1 and S2, no murmur/rub/gallop; No lower extremity edema; Peripheral pulses are 2+ bilaterally  ABDOMEN: Nontender to palpation, normoactive bowel sounds, no rebound/guarding; No hepatosplenomegaly  MUSCULOSKELETAL: no clubbing or cyanosis of digits; no joint swelling or tenderness to palpation  PSYCH: A+O to person, place, and time; affect appropriate      LABS:  cret            LABS:                        11.4   4.82  )-----------( 346      ( 01 May 2025 10:29 )             34.9     05-    141  |  109[H]  |  11  ----------------------------<  73  4.1   |  24  |  0.82    Ca    9.0      01 May 2025 10:29    TPro  6.3  /  Alb  3.2[L]  /  TBili  0.2  /  DBili  x   /  AST  22  /  ALT  18  /  AlkPhos  33[L]  05-          Urinalysis Basic - ( 02 May 2025 10:20 )    Color: Yellow / Appearance: Clear / S.012 / pH: x  Gluc: x / Ketone: Negative mg/dL  / Bili: Negative / Urobili: 0.2 mg/dL   Blood: x / Protein: Negative mg/dL / Nitrite: Negative   Leuk Esterase: Negative / RBC: 0 /HPF / WBC 1 /HPF   Sq Epi: x / Non Sq Epi: 0 /HPF / Bacteria: Negative /HPF          RADIOLOGY & ADDITIONAL TESTS:  Results Reviewed:   Imaging Personally Reviewed:  Electrocardiogram Personally Reviewed:    COORDINATION OF CARE:  Care Discussed with Consultants/Other Providers [Y/N]:  Prior or Outpatient Records Reviewed [Y/N]:

## 2025-05-05 NOTE — BH INPATIENT PSYCHIATRY PROGRESS NOTE - CURRENT MEDICATION
MEDICATIONS  (STANDING):  fluticasone propionate 50 MICROgram(s)/spray Nasal Spray 1 Spray(s) Both Nostrils two times a day  OLANZapine 2.5 milliGRAM(s) Oral daily  OLANZapine 5 milliGRAM(s) Oral at bedtime    MEDICATIONS  (PRN):  acetaminophen     Tablet .. 650 milliGRAM(s) Oral every 6 hours PRN Mild Pain (1 - 3), Moderate Pain (4 - 6)  aluminum hydroxide/magnesium hydroxide/simethicone Suspension 30 milliLiter(s) Oral every 6 hours PRN Dyspepsia  hydrOXYzine hydrochloride 50 milliGRAM(s) Oral every 8 hours PRN anxiety  LORazepam     Tablet 2 milliGRAM(s) Oral every 6 hours PRN agitation  LORazepam   Injectable 2 milliGRAM(s) IntraMuscular once PRN severe agitation  LORazepam   Injectable 2 milliGRAM(s) IntraMuscular once PRN Catatonia  magnesium hydroxide Suspension 30 milliLiter(s) Oral daily PRN Constipation  melatonin. 3 milliGRAM(s) Oral at bedtime PRN Insomnia  OLANZapine Injectable 5 milliGRAM(s) IntraMuscular at bedtime PRN TOO  OLANZapine Injectable 2.5 milliGRAM(s) IntraMuscular daily PRN TOO   MEDICATIONS  (STANDING):  fluticasone propionate 50 MICROgram(s)/spray Nasal Spray 1 Spray(s) Both Nostrils two times a day  OLANZapine 2.5 milliGRAM(s) Oral daily  OLANZapine 7.5 milliGRAM(s) Oral at bedtime    MEDICATIONS  (PRN):  acetaminophen     Tablet .. 650 milliGRAM(s) Oral every 6 hours PRN Mild Pain (1 - 3), Moderate Pain (4 - 6)  aluminum hydroxide/magnesium hydroxide/simethicone Suspension 30 milliLiter(s) Oral every 6 hours PRN Dyspepsia  hydrOXYzine hydrochloride 50 milliGRAM(s) Oral every 8 hours PRN anxiety  LORazepam     Tablet 2 milliGRAM(s) Oral every 6 hours PRN agitation  LORazepam   Injectable 2 milliGRAM(s) IntraMuscular once PRN severe agitation  LORazepam   Injectable 2 milliGRAM(s) IntraMuscular once PRN Catatonia  magnesium hydroxide Suspension 30 milliLiter(s) Oral daily PRN Constipation  melatonin. 3 milliGRAM(s) Oral at bedtime PRN Insomnia  OLANZapine Injectable 5 milliGRAM(s) IntraMuscular at bedtime PRN TOO  OLANZapine Injectable 2.5 milliGRAM(s) IntraMuscular daily PRN TOO

## 2025-05-06 PROCEDURE — 99232 SBSQ HOSP IP/OBS MODERATE 35: CPT

## 2025-05-06 RX ORDER — OLANZAPINE 10 MG/1
7.5 TABLET ORAL AT BEDTIME
Refills: 0 | Status: COMPLETED | OUTPATIENT
Start: 2025-05-06 | End: 2025-05-06

## 2025-05-06 RX ORDER — OLANZAPINE 10 MG/1
10 TABLET ORAL AT BEDTIME
Refills: 0 | Status: DISCONTINUED | OUTPATIENT
Start: 2025-05-07 | End: 2025-05-12

## 2025-05-06 RX ADMIN — OLANZAPINE 2.5 MILLIGRAM(S): 10 TABLET ORAL at 09:08

## 2025-05-06 RX ADMIN — OLANZAPINE 7.5 MILLIGRAM(S): 10 TABLET ORAL at 21:01

## 2025-05-06 NOTE — BH INPATIENT PSYCHIATRY PROGRESS NOTE - NSBHCHARTREVIEWVS_PSY_A_CORE FT
Vital Signs Last 24 Hrs  T(C): 36.9 (05-06-25 @ 08:03), Max: 36.9 (05-06-25 @ 08:03)  T(F): 98.4 (05-06-25 @ 08:03), Max: 98.4 (05-06-25 @ 08:03)  HR: --  BP: --  BP(mean): --  RR: --  SpO2: --    Orthostatic VS  05-06-25 @ 08:03  Lying BP: --/-- HR: --  Sitting BP: 142/71 HR: 82  Standing BP: 132/86 HR: 99  Site: --  Mode: --  Orthostatic VS  05-04-25 @ 18:38  Lying BP: --/-- HR: --  Sitting BP: 117/81 HR: 90  Standing BP: 113/83 HR: 105  Site: --  Mode: --   Vital Signs Last 24 Hrs  T(C): 36.9 (05-06-25 @ 08:03), Max: 36.9 (05-06-25 @ 08:03)  T(F): 98.4 (05-06-25 @ 08:03), Max: 98.4 (05-06-25 @ 08:03)  HR: --  BP: --  BP(mean): --  RR: --  SpO2: --    Orthostatic VS  05-06-25 @ 08:03  Lying BP: --/-- HR: --  Sitting BP: 142/71 HR: 82  Standing BP: 132/86 HR: 99  Site: --  Mode: --

## 2025-05-06 NOTE — BH INPATIENT PSYCHIATRY PROGRESS NOTE - CURRENT MEDICATION
MEDICATIONS  (STANDING):  fluticasone propionate 50 MICROgram(s)/spray Nasal Spray 1 Spray(s) Both Nostrils two times a day  OLANZapine 7.5 milliGRAM(s) Oral at bedtime    MEDICATIONS  (PRN):  acetaminophen     Tablet .. 650 milliGRAM(s) Oral every 6 hours PRN Mild Pain (1 - 3), Moderate Pain (4 - 6)  aluminum hydroxide/magnesium hydroxide/simethicone Suspension 30 milliLiter(s) Oral every 6 hours PRN Dyspepsia  hydrOXYzine hydrochloride 50 milliGRAM(s) Oral every 8 hours PRN anxiety  LORazepam     Tablet 2 milliGRAM(s) Oral every 6 hours PRN agitation  LORazepam   Injectable 2 milliGRAM(s) IntraMuscular once PRN severe agitation  LORazepam   Injectable 2 milliGRAM(s) IntraMuscular once PRN Catatonia  magnesium hydroxide Suspension 30 milliLiter(s) Oral daily PRN Constipation  melatonin. 3 milliGRAM(s) Oral at bedtime PRN Insomnia  OLANZapine Injectable 5 milliGRAM(s) IntraMuscular at bedtime PRN TOO  OLANZapine Injectable 2.5 milliGRAM(s) IntraMuscular daily PRN TOO

## 2025-05-06 NOTE — BH INPATIENT PSYCHIATRY PROGRESS NOTE - NSBHMETABOLIC_PSY_ALL_CORE_FT
BMI: BMI (kg/m2): 28.4 (05-03-25 @ 08:11)  HbA1c: A1C with Estimated Average Glucose Result: 5.2 % (04-23-25 @ 12:32)    Glucose:   BP: 124/79 (05-05-25 @ 09:11) (124/79 - 124/79)Vital Signs Last 24 Hrs  T(C): 36.9 (05-06-25 @ 08:03), Max: 36.9 (05-06-25 @ 08:03)  T(F): 98.4 (05-06-25 @ 08:03), Max: 98.4 (05-06-25 @ 08:03)  HR: --  BP: --  BP(mean): --  RR: --  SpO2: --    Orthostatic VS  05-06-25 @ 08:03  Lying BP: --/-- HR: --  Sitting BP: 142/71 HR: 82  Standing BP: 132/86 HR: 99  Site: --  Mode: --  Orthostatic VS  05-04-25 @ 18:38  Lying BP: --/-- HR: --  Sitting BP: 117/81 HR: 90  Standing BP: 113/83 HR: 105  Site: --  Mode: --    Lipid Panel: Date/Time: 04-23-25 @ 12:32  Cholesterol, Serum: 207  LDL Cholesterol Calculated: 138  HDL Cholesterol, Serum: 56  Total Cholesterol/HDL Ration Measurement: --  Triglycerides, Serum: 76   BMI: BMI (kg/m2): 28.4 (05-03-25 @ 08:11)  HbA1c: A1C with Estimated Average Glucose Result: 5.2 % (04-23-25 @ 12:32)    Glucose:   BP: 124/79 (05-05-25 @ 09:11) (124/79 - 124/79)Vital Signs Last 24 Hrs  T(C): 36.9 (05-06-25 @ 08:03), Max: 36.9 (05-06-25 @ 08:03)  T(F): 98.4 (05-06-25 @ 08:03), Max: 98.4 (05-06-25 @ 08:03)  HR: --  BP: --  BP(mean): --  RR: --  SpO2: --    Orthostatic VS  05-06-25 @ 08:03  Lying BP: --/-- HR: --  Sitting BP: 142/71 HR: 82  Standing BP: 132/86 HR: 99  Site: --  Mode: --    Lipid Panel: Date/Time: 04-23-25 @ 12:32  Cholesterol, Serum: 207  LDL Cholesterol Calculated: 138  HDL Cholesterol, Serum: 56  Total Cholesterol/HDL Ration Measurement: --  Triglycerides, Serum: 76

## 2025-05-07 PROCEDURE — 99232 SBSQ HOSP IP/OBS MODERATE 35: CPT

## 2025-05-07 RX ADMIN — OLANZAPINE 10 MILLIGRAM(S): 10 TABLET ORAL at 21:29

## 2025-05-07 NOTE — BH PATIENT PROFILE - NSNUTRITIONASSESS_GEN_ALL_CORE
no
Wants to speak to a dietitian about his/her therapeutic diet/Weight gain or weight loss of more than 10 lbs. within the last 3 months

## 2025-05-07 NOTE — BH INPATIENT PSYCHIATRY PROGRESS NOTE - NSBHCHARTREVIEWVS_PSY_A_CORE FT
Vital Signs Last 24 Hrs  T(C): --  T(F): --  HR: --  BP: --  BP(mean): --  RR: --  SpO2: --    Orthostatic VS  05-06-25 @ 08:03  Lying BP: --/-- HR: --  Sitting BP: 142/71 HR: 82  Standing BP: 132/86 HR: 99  Site: --  Mode: --

## 2025-05-07 NOTE — BH INPATIENT PSYCHIATRY PROGRESS NOTE - ATTENDING COMMENTS
Care was discussed and reviewed in the interdisciplinary treatment team.  I, Marielle Cook MD, have reviewed and verified the documentation.     Care was discussed and reviewed in the interdisciplinary treatment team.  I, Marielle Cook MD, have reviewed and verified the documentation.      Patient has continue to improve with the medications but she is challenging her diagnosis of psychosis. She tells me that she is going to look for a  and initiate a defamation case against the hospital. She feel the her rights were violated as she was not present during court for the TOO. I reminded the patient that she refused to attend but the patient doesn't believe this was the case.

## 2025-05-07 NOTE — BH INPATIENT PSYCHIATRY PROGRESS NOTE - NSBHMETABOLIC_PSY_ALL_CORE_FT
BMI: BMI (kg/m2): 28.4 (05-03-25 @ 08:11)  HbA1c: A1C with Estimated Average Glucose Result: 5.2 % (04-23-25 @ 12:32)    Glucose:   BP: 124/79 (05-05-25 @ 09:11) (124/79 - 124/79)Vital Signs Last 24 Hrs  T(C): --  T(F): --  HR: --  BP: --  BP(mean): --  RR: --  SpO2: --    Orthostatic VS  05-06-25 @ 08:03  Lying BP: --/-- HR: --  Sitting BP: 142/71 HR: 82  Standing BP: 132/86 HR: 99  Site: --  Mode: --    Lipid Panel: Date/Time: 04-23-25 @ 12:32  Cholesterol, Serum: 207  LDL Cholesterol Calculated: 138  HDL Cholesterol, Serum: 56  Total Cholesterol/HDL Ration Measurement: --  Triglycerides, Serum: 76

## 2025-05-07 NOTE — BH INPATIENT PSYCHIATRY PROGRESS NOTE - CURRENT MEDICATION
MEDICATIONS  (STANDING):  fluticasone propionate 50 MICROgram(s)/spray Nasal Spray 1 Spray(s) Both Nostrils two times a day  OLANZapine 10 milliGRAM(s) Oral at bedtime    MEDICATIONS  (PRN):  acetaminophen     Tablet .. 650 milliGRAM(s) Oral every 6 hours PRN Mild Pain (1 - 3), Moderate Pain (4 - 6)  aluminum hydroxide/magnesium hydroxide/simethicone Suspension 30 milliLiter(s) Oral every 6 hours PRN Dyspepsia  hydrOXYzine hydrochloride 50 milliGRAM(s) Oral every 8 hours PRN anxiety  LORazepam     Tablet 2 milliGRAM(s) Oral every 6 hours PRN agitation  LORazepam   Injectable 2 milliGRAM(s) IntraMuscular once PRN severe agitation  LORazepam   Injectable 2 milliGRAM(s) IntraMuscular once PRN Catatonia  magnesium hydroxide Suspension 30 milliLiter(s) Oral daily PRN Constipation  melatonin. 3 milliGRAM(s) Oral at bedtime PRN Insomnia  OLANZapine Injectable 5 milliGRAM(s) IntraMuscular at bedtime PRN TOO  OLANZapine Injectable 2.5 milliGRAM(s) IntraMuscular daily PRN TOO

## 2025-05-08 PROCEDURE — 99232 SBSQ HOSP IP/OBS MODERATE 35: CPT

## 2025-05-08 RX ADMIN — OLANZAPINE 10 MILLIGRAM(S): 10 TABLET ORAL at 21:15

## 2025-05-08 NOTE — BH INPATIENT PSYCHIATRY PROGRESS NOTE - NSBHCHARTREVIEWVS_PSY_A_CORE FT
Vital Signs Last 24 Hrs  T(C): 36.4 (05-08-25 @ 09:20), Max: 36.4 (05-08-25 @ 09:20)  T(F): 97.5 (05-08-25 @ 09:20), Max: 97.5 (05-08-25 @ 09:20)  HR: 119 (05-08-25 @ 09:20) (119 - 119)  BP: 129/92 (05-08-25 @ 09:20) (129/92 - 129/92)  BP(mean): --  RR: --  SpO2: --

## 2025-05-08 NOTE — BH INPATIENT PSYCHIATRY PROGRESS NOTE - NSBHMETABOLIC_PSY_ALL_CORE_FT
BMI: BMI (kg/m2): 28.4 (05-03-25 @ 08:11)  HbA1c: A1C with Estimated Average Glucose Result: 5.2 % (04-23-25 @ 12:32)    Glucose:   BP: 129/92 (05-08-25 @ 09:20) (129/92 - 129/92)Vital Signs Last 24 Hrs  T(C): 36.4 (05-08-25 @ 09:20), Max: 36.4 (05-08-25 @ 09:20)  T(F): 97.5 (05-08-25 @ 09:20), Max: 97.5 (05-08-25 @ 09:20)  HR: 119 (05-08-25 @ 09:20) (119 - 119)  BP: 129/92 (05-08-25 @ 09:20) (129/92 - 129/92)  BP(mean): --  RR: --  SpO2: --      Lipid Panel: Date/Time: 04-23-25 @ 12:32  Cholesterol, Serum: 207  LDL Cholesterol Calculated: 138  HDL Cholesterol, Serum: 56  Total Cholesterol/HDL Ration Measurement: --  Triglycerides, Serum: 76

## 2025-05-09 PROCEDURE — 99232 SBSQ HOSP IP/OBS MODERATE 35: CPT

## 2025-05-09 RX ADMIN — OLANZAPINE 10 MILLIGRAM(S): 10 TABLET ORAL at 22:04

## 2025-05-09 NOTE — BH INPATIENT PSYCHIATRY PROGRESS NOTE - NSBHMETABOLIC_PSY_ALL_CORE_FT
BMI: BMI (kg/m2): 28.4 (05-03-25 @ 08:11)  HbA1c: A1C with Estimated Average Glucose Result: 5.2 % (04-23-25 @ 12:32)    Glucose:   BP: 129/92 (05-08-25 @ 09:20) (129/92 - 129/92)Vital Signs Last 24 Hrs  T(C): 36.7 (05-09-25 @ 08:14), Max: 36.7 (05-09-25 @ 08:14)  T(F): 98.1 (05-09-25 @ 08:14), Max: 98.1 (05-09-25 @ 08:14)  HR: --  BP: --  BP(mean): --  RR: --  SpO2: --    Orthostatic VS  05-09-25 @ 08:14  Lying BP: --/-- HR: --  Sitting BP: 122/88 HR: 75  Standing BP: 126/91 HR: 105  Site: --  Mode: --    Lipid Panel: Date/Time: 04-23-25 @ 12:32  Cholesterol, Serum: 207  LDL Cholesterol Calculated: 138  HDL Cholesterol, Serum: 56  Total Cholesterol/HDL Ration Measurement: --  Triglycerides, Serum: 76

## 2025-05-09 NOTE — BH INPATIENT PSYCHIATRY PROGRESS NOTE - CURRENT MEDICATION
MEDICATIONS  (STANDING):  fluticasone propionate 50 MICROgram(s)/spray Nasal Spray 1 Spray(s) Both Nostrils two times a day  OLANZapine 10 milliGRAM(s) Oral at bedtime    MEDICATIONS  (PRN):  acetaminophen     Tablet .. 650 milliGRAM(s) Oral every 6 hours PRN Mild Pain (1 - 3), Moderate Pain (4 - 6)  aluminum hydroxide/magnesium hydroxide/simethicone Suspension 30 milliLiter(s) Oral every 6 hours PRN Dyspepsia  hydrOXYzine hydrochloride 50 milliGRAM(s) Oral every 8 hours PRN anxiety  LORazepam     Tablet 2 milliGRAM(s) Oral every 6 hours PRN agitation  LORazepam   Injectable 2 milliGRAM(s) IntraMuscular once PRN severe agitation  LORazepam   Injectable 2 milliGRAM(s) IntraMuscular once PRN Catatonia  magnesium hydroxide Suspension 30 milliLiter(s) Oral daily PRN Constipation  melatonin. 3 milliGRAM(s) Oral at bedtime PRN Insomnia  OLANZapine Injectable 5 milliGRAM(s) IntraMuscular at bedtime PRN TOO

## 2025-05-09 NOTE — BH INPATIENT PSYCHIATRY PROGRESS NOTE - NSBHCHARTREVIEWVS_PSY_A_CORE FT
Vital Signs Last 24 Hrs  T(C): 36.7 (05-09-25 @ 08:14), Max: 36.7 (05-09-25 @ 08:14)  T(F): 98.1 (05-09-25 @ 08:14), Max: 98.1 (05-09-25 @ 08:14)  HR: --  BP: --  BP(mean): --  RR: --  SpO2: --    Orthostatic VS  05-09-25 @ 08:14  Lying BP: --/-- HR: --  Sitting BP: 122/88 HR: 75  Standing BP: 126/91 HR: 105  Site: --  Mode: --

## 2025-05-10 RX ADMIN — OLANZAPINE 10 MILLIGRAM(S): 10 TABLET ORAL at 21:20

## 2025-05-11 RX ADMIN — OLANZAPINE 10 MILLIGRAM(S): 10 TABLET ORAL at 22:40

## 2025-05-12 PROCEDURE — 99232 SBSQ HOSP IP/OBS MODERATE 35: CPT

## 2025-05-12 RX ORDER — LORAZEPAM 4 MG/ML
2 VIAL (ML) INJECTION EVERY 6 HOURS
Refills: 0 | Status: DISCONTINUED | OUTPATIENT
Start: 2025-05-12 | End: 2025-05-16

## 2025-05-12 RX ORDER — OLANZAPINE 10 MG/1
7.5 TABLET ORAL AT BEDTIME
Refills: 0 | Status: DISCONTINUED | OUTPATIENT
Start: 2025-05-12 | End: 2025-05-16

## 2025-05-12 RX ORDER — LORAZEPAM 4 MG/ML
2 VIAL (ML) INJECTION ONCE
Refills: 0 | Status: DISCONTINUED | OUTPATIENT
Start: 2025-05-12 | End: 2025-05-16

## 2025-05-12 RX ADMIN — OLANZAPINE 7.5 MILLIGRAM(S): 10 TABLET ORAL at 21:22

## 2025-05-12 NOTE — BH INPATIENT PSYCHIATRY PROGRESS NOTE - CURRENT MEDICATION
MEDICATIONS  (STANDING):  fluticasone propionate 50 MICROgram(s)/spray Nasal Spray 1 Spray(s) Both Nostrils two times a day  OLANZapine 7.5 milliGRAM(s) Oral at bedtime    MEDICATIONS  (PRN):  acetaminophen     Tablet .. 650 milliGRAM(s) Oral every 6 hours PRN Mild Pain (1 - 3), Moderate Pain (4 - 6)  aluminum hydroxide/magnesium hydroxide/simethicone Suspension 30 milliLiter(s) Oral every 6 hours PRN Dyspepsia  hydrOXYzine hydrochloride 50 milliGRAM(s) Oral every 8 hours PRN anxiety  LORazepam     Tablet 2 milliGRAM(s) Oral every 6 hours PRN agitation  LORazepam   Injectable 2 milliGRAM(s) IntraMuscular once PRN severe agitation  LORazepam   Injectable 2 milliGRAM(s) IntraMuscular once PRN Catatonia  magnesium hydroxide Suspension 30 milliLiter(s) Oral daily PRN Constipation  melatonin. 3 milliGRAM(s) Oral at bedtime PRN Insomnia  OLANZapine Injectable 5 milliGRAM(s) IntraMuscular at bedtime PRN TOO

## 2025-05-12 NOTE — BH INPATIENT PSYCHIATRY PROGRESS NOTE - NSBHMETABOLIC_PSY_ALL_CORE_FT
BMI: BMI (kg/m2): 28.4 (05-03-25 @ 08:11)  HbA1c: A1C with Estimated Average Glucose Result: 5.2 % (04-23-25 @ 12:32)    Glucose:   BP: --Vital Signs Last 24 Hrs  T(C): --  T(F): --  HR: --  BP: --  BP(mean): --  RR: --  SpO2: --      Lipid Panel: Date/Time: 04-23-25 @ 12:32  Cholesterol, Serum: 207  LDL Cholesterol Calculated: 138  HDL Cholesterol, Serum: 56  Total Cholesterol/HDL Ration Measurement: --  Triglycerides, Serum: 76

## 2025-05-12 NOTE — BH INPATIENT PSYCHIATRY PROGRESS NOTE - ATTENDING COMMENTS
Care was discussed and reviewed in the interdisciplinary treatment team.  IMarielle MD, have reviewed and verified the documentation.       Patient was evaluated with her mother and NP also present during the assessment. Patient presented as bright and pleasant during the assessment. Adjustments on the medication has been reviewed. We also discussed tentative discharge plans to [facility]. We have advised the patient to continue to be compliant with the treatment when she leaves the hospital in order to prevent decompensation and re-hospitalization. Patient admitted discontinuing the Risperdal after her last hospitalization. This time patient said it will be different as she said she has learn her lesson.   Patient has been educated about the use of TMS vs ECT and informed that this treatment modalities are available in the outpatient setting.

## 2025-05-13 PROCEDURE — 99232 SBSQ HOSP IP/OBS MODERATE 35: CPT

## 2025-05-13 RX ADMIN — OLANZAPINE 7.5 MILLIGRAM(S): 10 TABLET ORAL at 21:38

## 2025-05-14 PROCEDURE — 99232 SBSQ HOSP IP/OBS MODERATE 35: CPT

## 2025-05-14 RX ADMIN — OLANZAPINE 7.5 MILLIGRAM(S): 10 TABLET ORAL at 20:23

## 2025-05-15 LAB
ALBUMIN SERPL ELPH-MCNC: 3.7 G/DL — SIGNIFICANT CHANGE UP (ref 3.3–5)
ALP SERPL-CCNC: 38 U/L — LOW (ref 40–120)
ALT FLD-CCNC: 42 U/L — HIGH (ref 4–33)
ANION GAP SERPL CALC-SCNC: 10 MMOL/L — SIGNIFICANT CHANGE UP (ref 7–14)
AST SERPL-CCNC: 35 U/L — HIGH (ref 4–32)
BASOPHILS # BLD AUTO: 0.01 K/UL — SIGNIFICANT CHANGE UP (ref 0–0.2)
BASOPHILS NFR BLD AUTO: 0.2 % — SIGNIFICANT CHANGE UP (ref 0–2)
BILIRUB SERPL-MCNC: 0.4 MG/DL — SIGNIFICANT CHANGE UP (ref 0.2–1.2)
BUN SERPL-MCNC: 10 MG/DL — SIGNIFICANT CHANGE UP (ref 7–23)
CALCIUM SERPL-MCNC: 9.3 MG/DL — SIGNIFICANT CHANGE UP (ref 8.4–10.5)
CHLORIDE SERPL-SCNC: 107 MMOL/L — SIGNIFICANT CHANGE UP (ref 98–107)
CO2 SERPL-SCNC: 24 MMOL/L — SIGNIFICANT CHANGE UP (ref 22–31)
CREAT SERPL-MCNC: 0.87 MG/DL — SIGNIFICANT CHANGE UP (ref 0.5–1.3)
EGFR: 88 ML/MIN/1.73M2 — SIGNIFICANT CHANGE UP
EGFR: 88 ML/MIN/1.73M2 — SIGNIFICANT CHANGE UP
EOSINOPHIL # BLD AUTO: 0.17 K/UL — SIGNIFICANT CHANGE UP (ref 0–0.5)
EOSINOPHIL NFR BLD AUTO: 3.1 % — SIGNIFICANT CHANGE UP (ref 0–6)
GLUCOSE SERPL-MCNC: 108 MG/DL — HIGH (ref 70–99)
HCT VFR BLD CALC: 39.5 % — SIGNIFICANT CHANGE UP (ref 34.5–45)
HGB BLD-MCNC: 13 G/DL — SIGNIFICANT CHANGE UP (ref 11.5–15.5)
IANC: 2 K/UL — SIGNIFICANT CHANGE UP (ref 1.8–7.4)
IMM GRANULOCYTES NFR BLD AUTO: 0.2 % — SIGNIFICANT CHANGE UP (ref 0–0.9)
LYMPHOCYTES # BLD AUTO: 3.04 K/UL — SIGNIFICANT CHANGE UP (ref 1–3.3)
LYMPHOCYTES # BLD AUTO: 54.7 % — HIGH (ref 13–44)
MCHC RBC-ENTMCNC: 30.2 PG — SIGNIFICANT CHANGE UP (ref 27–34)
MCHC RBC-ENTMCNC: 32.9 G/DL — SIGNIFICANT CHANGE UP (ref 32–36)
MCV RBC AUTO: 91.6 FL — SIGNIFICANT CHANGE UP (ref 80–100)
MONOCYTES # BLD AUTO: 0.33 K/UL — SIGNIFICANT CHANGE UP (ref 0–0.9)
MONOCYTES NFR BLD AUTO: 5.9 % — SIGNIFICANT CHANGE UP (ref 2–14)
NEUTROPHILS # BLD AUTO: 2 K/UL — SIGNIFICANT CHANGE UP (ref 1.8–7.4)
NEUTROPHILS NFR BLD AUTO: 35.9 % — LOW (ref 43–77)
NRBC # BLD AUTO: 0 K/UL — SIGNIFICANT CHANGE UP (ref 0–0)
NRBC # FLD: 0 K/UL — SIGNIFICANT CHANGE UP (ref 0–0)
NRBC BLD AUTO-RTO: 0 /100 WBCS — SIGNIFICANT CHANGE UP (ref 0–0)
PLATELET # BLD AUTO: 504 K/UL — HIGH (ref 150–400)
POTASSIUM SERPL-MCNC: 3.7 MMOL/L — SIGNIFICANT CHANGE UP (ref 3.5–5.3)
POTASSIUM SERPL-SCNC: 3.7 MMOL/L — SIGNIFICANT CHANGE UP (ref 3.5–5.3)
PROT SERPL-MCNC: 6.9 G/DL — SIGNIFICANT CHANGE UP (ref 6–8.3)
RBC # BLD: 4.31 M/UL — SIGNIFICANT CHANGE UP (ref 3.8–5.2)
RBC # FLD: 15.1 % — HIGH (ref 10.3–14.5)
SODIUM SERPL-SCNC: 141 MMOL/L — SIGNIFICANT CHANGE UP (ref 135–145)
WBC # BLD: 5.56 K/UL — SIGNIFICANT CHANGE UP (ref 3.8–10.5)
WBC # FLD AUTO: 5.56 K/UL — SIGNIFICANT CHANGE UP (ref 3.8–10.5)

## 2025-05-15 PROCEDURE — 99232 SBSQ HOSP IP/OBS MODERATE 35: CPT

## 2025-05-15 RX ORDER — MELATONIN 5 MG
1 TABLET ORAL
Qty: 30 | Refills: 0
Start: 2025-05-15 | End: 2025-06-13

## 2025-05-15 RX ORDER — OLANZAPINE 10 MG/1
1 TABLET ORAL
Qty: 30 | Refills: 0
Start: 2025-05-15 | End: 2025-06-13

## 2025-05-15 RX ORDER — HYDROXYZINE HYDROCHLORIDE 25 MG/1
1 TABLET, FILM COATED ORAL
Qty: 30 | Refills: 0
Start: 2025-05-15 | End: 2025-05-29

## 2025-05-15 RX ADMIN — Medication 30 MILLILITER(S): at 17:13

## 2025-05-15 RX ADMIN — OLANZAPINE 7.5 MILLIGRAM(S): 10 TABLET ORAL at 20:42

## 2025-05-15 NOTE — BH TREATMENT PLAN - NSTXPSYCHOINTERMD_PSY_ALL_CORE
medications + therapy

## 2025-05-15 NOTE — BH TREATMENT PLAN - NSTXPSYCHOGOAL_PSY_ALL_CORE
Attended Phase II Cardiac Rehab. No medication or health history changes reported. See [facility] for details.  
Will ask for PRN medication to manage hallucinations
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
## 2025-05-15 NOTE — BH INPATIENT PSYCHIATRY PROGRESS NOTE - NSBHFUPINTERVALCCFT_PSY_A_CORE
follow up psychosis 

## 2025-05-15 NOTE — BH TREATMENT PLAN - NSTXMEDICINTERMD_PSY_ALL_CORE
Sneed
Med management-ELIZABETH; group and milieu therapy 
Med management-ELIZABETH; group and milieu therapy 
Sneed

## 2025-05-15 NOTE — BH TREATMENT PLAN - NSTXDCOTHRGOAL_PSY_ALL_CORE
Patient will report improved mood and insight into coping skills for symptoms, and with no SIIP.
Sx improvement

## 2025-05-15 NOTE — BH INPATIENT PSYCHIATRY PROGRESS NOTE - NSTXDISORGPROGRES_PSY_ALL_CORE
No Change
No Change
Improving
No Change
Improving
No Change
Improving
No Change

## 2025-05-15 NOTE — BH INPATIENT PSYCHIATRY PROGRESS NOTE - NSBHFUPINTERVALHXFT_PSY_A_CORE
F/u for psychosis. Chart reviewed and case discussed with the team. Patient continues to refuse medications. Patient refused to leave room to meet. Patient was none verbal during interview, staring oddly and shaking head to answer. Patient was malodorous and bed was soiled with what appeared to be menstrual blood. Patient was sitting on bed and not moving the entire day. Patient refused to engage. Patient shook head not when asked about SIIP, HIIP, AVH. Per nursing patient has little to no intake. Patient continues to refuse labs. VSS. CBC and CMP order for tomorrow. Patient shook her head no when asked about diziness, lightheadedness and unsteady gait.  
F/u for psychosis. Chart reviewed and case discussed with the team. Patient continues to refuse medications. This writer met with the patient, NP Eloisa, and NP student in her room. She has been seen by staff standing in one position for long periods of time in the unit and not sitting for longer than 15 minutes, JOE was called last night to evaluate patient. Patient appears disheveled has facial hair and hair is matted, not eating or drinking fluids. She was posturing and staring oddly when we approached her. She said she was okay with speaking today, refused to go to interview room but agreed to speak in her room. 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 She reported "eating a little something" this morning and smiled, but would not elaborate. She eluded to having an eating disorder and suggest she get treatment. When asked about why she is always standing, she reported back pain that's been going on for a long time from working at a desk for long periods of time. We encouraged her to rest, drink fluids, and eat. She reports no SI, thoughts of harming herself or others, and no AH/VH. She refused labs this morning, agreed to have labs taken during interview, informed nurses.  Patient only slept 3 hours intermittently last night. 
chart reviewed including pertinent labs. Chart reviewed and case discussed in team meeting. Patient appears disheveled has facial hair and hair is matted, not eating or drinking fluids. She was posturing and staring oddly. Patient was approached to interview, she asked why it was necessary. Patient declined to meet, saying she would rather not speak, she denied SIIP. She reported she is taking medications, unsure of name, jowever per nursing she has been refusing medication. She then walked away from writer. Interview was ended. Informed we would meet regularly.   
F/u for psychosis. Chart reviewed and case discussed in team meeting. Patient was seen in the community early in the day. Later was in bed because she said the chairs hurt her back. Patient feels she knows her body, doesn't believe she had symptoms of psychosis, believes it is an eating disorder, OCD and phobias and does not need medication for psychosis. However she does feel Zyprexa is helping her. She was able to discuss past medication history of Prozac in 2017 which she discontinued when she felt better. Patient was able to acknowledge realizing she was malodorous but was unable to explain whey she didn't shower. No facial swelling noted today. She reported increased urination which she believes has helped. Patient requested Risperdal instead of Zyprexa but could not express why she didn't take the Risperdal when offered at the beginning of this hospitalization and the prior hospitalization. She denied SE. 
F/u for psychosis. Chart reviewed and case discussed with the team. Per nursing, patient took PO Invega last night with much encouragement, she did not require the TOO IM. Patient was seen pacing the unit in the morning. Patient increased speed when she saw exit door was open and appeared to attempt to elope. Patient remains malodorous. Patient was in bed sleeping later in the day and refused to get up to meet. Attempted again however to would not wake. She was arousable. 
F/u for psychosis. Chart reviewed and case discussed in team meeting. Met patient with Mother in group room. Mother inquired about plan. Discussed continued medication adjustment. Per nursing patient has been sedated with current dose of medication. Discussed decreasing dose to 7.5mg and continuing to monitor for benefits. Discussed referral to [facility]. Patient appeared disheveled. Encouraged her to shower, attend groups and continue medications.   
F/u for psychosis. Chart reviewed and case discussed in team meeting.  Patient has been superficially engaged, not attending groups, in bed most of the day. She did come out of breakfast. She does not sit when visiting with Mother. Patient feels safe to be discharged tomorrow. She expressed understanding that she need to continue the medications, she does endorse feeling better on the medication however inquired about having medications changed when she goes to outpatient appointment. She denied SIIP, HIIP, AVH.    
chart reviewed including pertinent labs. Case discussed with nursing staff. patient continues to refuse standing medications. this AM, patient refused to speak with me despite me calling out her name several times. later, patient approached and she spoke, inquired about medications that team is proposing. she denies AVH. per staff, patient predominantly staring off into the distance all day yesterday appearing distracted. pt is also not eating or drinking much and refusing labs 
F/u for psychosis. Chart reviewed and case discussed in team meeting. Patient initially refused Zyprexa but took it later. UA was wnl. Patient refused to engage and walker away. She refused again later but was able to deny SIIP, HIIP, AVH. Patient was visible part of the day. Patient was in room covered head to toe with blanket upon approach. No facial edema noted today. Hospitalist evaluated. 
F/u for psychosis. Patient has been informed that we are discontinuing the Invega and changing it to Zyprexa. Patient reported to the staff yesterday that she also had facial swelling with the Risperdal and this is the reason she stop the medication. Patient inquire about taking Risperdal but I informed the patient that it will be better to change her to Zyprexa. Patient received education about the Zyprexa and she was in agreement with the change. Later on patient was refusing the Zyprexa, nursing provided  more education and patient was reminded that there is a TOO order.     Patient was asking about appealing the TOO and had an opportunity to meet with the [facility] . 
F/u for psychosis. Chart reviewed and case discussed in team meeting. Patient noted to have facial edema. Hospitalist consulted and requested urine obtained for concerns of Nephrotic syndrome. Discussed with patient and urged her to provide urine. Patient showered and washed hair today. Patient joked she only being 20% christen and requested shaving privileges. Patient was sitting in dayroom with peers. Patient was eating. Patient reported she has agoraphobia and wants to be discharged to do exposure therapy. 
F/u for psychosis. Chart reviewed and case discussed in team meeting. Patient was evaluated at bedside. Patient said that she doesn't want to get out of bed as she has been tired and bored. She denied SIIP, HIIP, AVH. Remains delusional as she doesn't believe that her TOO is valid and has been requesting for a copy. This has been provided to her on prior visits.     
F/u for psychosis. Chart reviewed and case discussed with the team. Patient continues to refuse medications. Patient refused to leave room to meet. Patient was none verbal during interview, she would not even acknowledge writers presence, staring off to the other side of the room. Patient refused to attend court for TOO. Patient refused labs today. Patient was standing in room, rocking back and forth. Patient remains malodorous with soiled bed linens (menstrual blood). Patient shook head no when asked about SIIP, HIIP, AVH. 
F/u for psychosis. Chart reviewed and case discussed with the team. Patient continues to refuse medications. Patient refused to leave room to meet. TOO order obtained. Invega, Zyprexa and Ativan ordered. Informed patient about TOO for medications, vitals labs, etc. patient inquired if order is forever. Also discussed criteria for discharge including medication adherence, attending groups and performing ADLs. Patient did not understand the need for TOO, stating she is "not objecting". educated that her refusal to engage in treatment is objecting. Patient was given Ativan 2mg for concerns of Catatonia, she took via PO with much encouragement. Labs were drawn. Patient continues to refuse to eat or drink fluids. Patient was malodorous and disheveled, continues to have soiled linens.  
F/u for psychosis. Chart reviewed and case discussed in team meeting. Patient remains engaged with this writer, able to joke and have a conversation. Patient now believes her symptoms are related to hypochondria and not psychosis. She then challenged her self saying the symptoms of hypochondria can exhibit as symptoms of psychosis. Patient does endorse feeling better with medication however does not feel TOO was necessary. Educated that she was unable to take medication without the TOO order which is necessary to promptly help patients to work toward discharge in a timely fashion. She agreed. She was visible on the unit, showered and groomed. Per staff she is not attending groups.  
F/u for psychosis. Chart reviewed and case discussed in team meeting. Patient was in room most of the day studying. Patient reported feeling better, less sedated. Encouraged patient to continue to shower, groom, attend groups and come out of room. Patient was able to joke about increasing her christen. Discussed potential discharge Friday. She inquired if she can change her medication to an antidepressant. She denied SIIP, HIIP, AVH.    
F/u for psychosis. Chart reviewed and case discussed with the team. Patient spent the entire day in bedroom, sitting or laying on bed. Patient initially refused to engage, staring at the wall or rolling eyes. Informed writer spoke with Mother and she expressed concern about facial swelling. Discussed potentially laying in bed most to he day can contribute to swelling. No facial swelling noted. She became irritable and argumentative about concerned about her posturing. Patient was able to discussed potential other reasons for Mother's concerns, such as that she is now eating and may have swelling from increased salt intake. Patient reported she needs Eating disorder treatment and that is the treatment she needs, she requested change of medication reported she feels the Ativan helped more. Patient reported she is not showering because she fears getting a foot fungus. Patient remains malodorous.     Spoke with Mother, she expressed concern that medication is causing facial swelling. Medication education provided. Discussed patient has showed some improvement however remains symptomatic and unable to perform ADLs.            
chart reviewed including pertinent labs. Case discussed with nursing staff. patient guarded, refusing labs, refusing interview. pt saw me and immediately walked away. she is standing out in the day room staring blankly and suspiciously. per staff, poor PO intake. intermittent adherence to vitals. pt nonverbal and wouldn't respond to my questions 
F/u for psychosis. Chart reviewed and case discussed with the team. Patient again required much encouragement to be adherent with medications and did not require IM. Patient was sitting on bed with head in hand most of the day. Patient initially refused to speak, saying "I would rather not speak today", however after some encouragement she was able to engage, make jokes and have a conversation. Patient remains malodorous, however reports she is showering. Offered sharps privileges, she refused. Patient reported feeling tired most of the day, feels the medications are too much. Medication education provided.     Later nursing reported patient attempted to elope. Elopement precautions ordered. Recommended patient be placed in hospital gowns, she refused to cooperate.  
F/u for psychosis. Chart reviewed and case discussed in team meeting. Patient is more engaged, attending groups, visible on the unit, seen interacting with peers. Patient continues to be difficult with medications however adherent. Patient is eating and showering. Patient feels the medication is helping. Some facial edema noted, however no issues breathing. She expressed concern about feeling tired in the morning. Discussed adjusting medication to all at night, she agreed. She denied SIIP, HIIP, AVH. 
F/u for psychosis. Chart reviewed and case discussed in team meeting. Per nursing patient is not longer engaging, not coming out for meals, refusing VS, not coming out of room. Discussed with patient, she reported feeling "good" and expressed understanding that she needs to engage. Encouraged her to engage so we can continue with potential discharge on Friday. She agreed, however she ignored staff we called for lunch. She denied SIIP, HIIP, AVH.    
F/u for psychosis. Chart reviewed and case discussed in team meeting. Patient was visible on the unit, earlier in the day. Later patient was in bed, reported feeling tired. She continues to attempt to refuse medications with nurses and requested dose to be decreased. Medication education provided. She denied SIIP, HIIP, AVH.     Spoke with Mother, Amanda. She requested information on discharge. Informed patient is not quite ready to be discharged due to medication titration and poor insight. She agreed. will keep her updated. 
F/u for psychosis. Chart reviewed and case discussed with the team. Patient continues to have poor ADLs and she is malodorous. Patient presented as oddly related and she was smiling inappropriately. Patient was more talkative and elevated. She said that she was feeling fie but that her face was swollen. Patient denied any problems swallowing, opening or closing her mouth and denied any stiffness. I didn't notices her face swollen. Patient said that she will preferred using Risperdal. I discuss with the patient that I will be reviewing the case with the medical team.  Case was reviewed with Dr. Marcus. We will continue to monitor patient's symptoms and for now I will hold the Invega.

## 2025-05-15 NOTE — BH INPATIENT PSYCHIATRY PROGRESS NOTE - NSICDXBHSECONDARYDX_PSY_ALL_CORE
Anxiety   F41.9  

## 2025-05-15 NOTE — BH TREATMENT PLAN - NSCMSPTSTRENGTHS_PSY_ALL_CORE
Humor/Supportive family

## 2025-05-15 NOTE — BH INPATIENT PSYCHIATRY PROGRESS NOTE - NSTXHYPERGOAL_PSY_ALL_CORE
Will identify 1 modifiable risk factor and a strategy to improve this

## 2025-05-15 NOTE — BH INPATIENT PSYCHIATRY PROGRESS NOTE - NSBHLEGALSTATUSCHANGE_PSY_ALL_CORE
Yes...
present
Yes...

## 2025-05-15 NOTE — BH INPATIENT PSYCHIATRY PROGRESS NOTE - NSBHATTESTBILLING_PSY_A_CORE
68739-Auugxwgutq OBS or IP - moderate complexity OR 35-49 mins
70153-Bxnhjgzzeo OBS or IP - moderate complexity OR 35-49 mins
94888-Qlgdhpwmcv OBS or IP - moderate complexity OR 35-49 mins
56311-Fzxyppjigm OBS or IP - moderate complexity OR 35-49 mins
73086-Dfcafelpzz OBS or IP - moderate complexity OR 35-49 mins
40121-Fnblzcgara OBS or IP - moderate complexity OR 35-49 mins
67762-Dkgovzsrug OBS or IP - moderate complexity OR 35-49 mins
57582-Sxmxyyohhc OBS or IP - moderate complexity OR 35-49 mins
75745-Zcbhttzvzx OBS or IP - moderate complexity OR 35-49 mins
08932-Vxsqemazbs OBS or IP - moderate complexity OR 35-49 mins
75252-Deqhyvblpp OBS or IP - moderate complexity OR 35-49 mins
08605-Slilrkssct OBS or IP - moderate complexity OR 35-49 mins
28713-Ylkdsnupku OBS or IP - moderate complexity OR 35-49 mins
43432-Etkvgvaubp OBS or IP - moderate complexity OR 35-49 mins
51688-Usmvetrlow OBS or IP - moderate complexity OR 35-49 mins
87454-Gjrvifghqu OBS or IP - moderate complexity OR 35-49 mins
02295-Fypdgqlvzx OBS or IP - moderate complexity OR 35-49 mins
09550-Qfgppozqvj OBS or IP - moderate complexity OR 35-49 mins
36807-Mbhryandpq OBS or IP - moderate complexity OR 35-49 mins
15784-Dvdremtach OBS or IP - moderate complexity OR 35-49 mins
81598-Rlyvidxegg OBS or IP - moderate complexity OR 35-49 mins
65091-Fzrnkvzwgg OBS or IP - moderate complexity OR 35-49 mins
05172-Dextdsqlye OBS or IP - moderate complexity OR 35-49 mins

## 2025-05-15 NOTE — BH INPATIENT PSYCHIATRY PROGRESS NOTE - NSTXDISORGINTERMD_PSY_ALL_CORE
medications

## 2025-05-15 NOTE — BH TREATMENT PLAN - NSTXMEDICINTERRN_PSY_ALL_CORE
Educate patient regarding medication regimen and importance of compliance

## 2025-05-15 NOTE — BH TREATMENT PLAN - NSTXHYPERGOAL_PSY_ALL_CORE
Will identify 1 modifiable risk factor and a strategy to improve this

## 2025-05-15 NOTE — BH INPATIENT PSYCHIATRY PROGRESS NOTE - NSTXDCOTHRDATETRGT_PSY_ALL_CORE
08-May-2025
08-May-2025
14-May-2025
23-Apr-2025
01-May-2025
15-May-2025
23-Apr-2025
01-May-2025
08-May-2025
23-Apr-2025
07-May-2025
01-May-2025
15-May-2025
23-Apr-2025
01-May-2025
08-May-2025
15-May-2025
14-May-2025
23-Apr-2025
21-May-2025
21-May-2025

## 2025-05-15 NOTE — BH INPATIENT PSYCHIATRY PROGRESS NOTE - NSTXPROBHYPER_PSY_ALL_CORE
HYPERTENSION

## 2025-05-15 NOTE — BH INPATIENT PSYCHIATRY PROGRESS NOTE - NSTXHYPERDATEEST_PSY_ALL_CORE
15-Apr-2025

## 2025-05-15 NOTE — BH INPATIENT PSYCHIATRY PROGRESS NOTE - NSTXMEDICINTERMD_PSY_ALL_CORE
Sneed
Med management-ELIZABETH; group and milieu therapy 
Sneed
Med management-ELIZABETH; group and milieu therapy 
Sneed
Sneed
Negative/Unknown

## 2025-05-15 NOTE — BH INPATIENT PSYCHIATRY PROGRESS NOTE - NSBHLEGALSTATUSDATE_PSY_ALL_CORE
15-Apr-2025

## 2025-05-15 NOTE — BH TREATMENT PLAN - NSTXDISORGINTERPR_PSY_ALL_CORE
Writer met with Pt to assess her progress towards Psych Rehab goal. Pt was not receptive. Upon approach Pt was sitting on her bed facing the wall with her head down. Pt remained silent and just said “ I do not want to talk with you.” While rolling her eyes and turned her face to the wall. Over the past week, Pt made no progress towards Psych Rehab goal. Pt remains mostly silent, for hours in standing in one posture and uncooperative with staff. Pt did not attend any of the groups. Over the next seven days, Psychiatric rehabilitation staff will continue to provide encouragement, support, psychotherapy, and psychoeducation to assist the Pt in the progression of her treatment goal.
During time of engagement, the patient presented with a slightly irritable mood. Patient was dressed in her personal clothes. Upon review, the patient has made no improvement towards her specified goal to make at least 3 goal and reality-oriented statements during therapy for her disorganization of thought/ behavior goal. The patient attended none of the groups this past week. Psychiatric Rehabilitation staff will continue to support patient via 1:1 engagement and encourage programming attendance in effort to facilitate continued progress towards goal.
Writer met with Pt to assess her progress towards Psych Rehab goal. Pt was receptive, fully engaged with good eye contact and discharge focused during the session. Pt was interacting meaningful with the writer however, minimizing her symptoms and she was superficial. Pt stated “I was not mute at all. I am just an introvert person. Also, I was not posturing for hours. It is just I have pain in my back and cannot sit.” Pt described her mood as “very good”. Over the week, Pt made no progress towards Psych Rehab goal. Pt was unable to make 3 goal and reality-oriented statements during the session. Pt did not attend any of the groups despite the encouragement of the staff.Over the next seven days, Psychiatric rehabilitation staff will continue to provide encouragement, support, psychotherapy, and psychoeducation to assist the Pt in the progression of her treatment goal.
Writer met with Pt to assess her progress towards Psych Rehab goal. Pt was receptive, fully engaged with good eye contact but discharge focused during the session. Pt was interacting meaningful with the writer and reality oriented. Pt did acknowledge that she had lack of self-care and did not eat. Over the week, Pt made some gains towards Psych Rehab goal. Pt made 3 goal and reality-oriented statements during the session. 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 Pt completed a safety plan. Pt attended 10% of the groups and continues to be isolative. Pt is compliant with meds. Over the next seven days, Psychiatric rehabilitation staff will continue to provide encouragement, support, psychotherapy, and psychoeducation to assist the Pt in the progression of her treatment goal.

## 2025-05-15 NOTE — BH INPATIENT PSYCHIATRY PROGRESS NOTE - NSTXDISORGGOAL_PSY_ALL_CORE
Will make at least 3 goal and reality oriented statements during therapy

## 2025-05-15 NOTE — BH INPATIENT PSYCHIATRY PROGRESS NOTE - NSTXPSYCHOGOAL_PSY_ALL_CORE
Will ask for PRN medication to manage hallucinations

## 2025-05-15 NOTE — BH INPATIENT PSYCHIATRY PROGRESS NOTE - NSTXDCOPNOINTERMD_PSY_ALL_CORE
ELIZABETH and psych ed

## 2025-05-15 NOTE — BH INPATIENT PSYCHIATRY PROGRESS NOTE - NSICDXBHPRIMARYDX_PSY_ALL_CORE
Psychosis   F28  

## 2025-05-15 NOTE — BH INPATIENT PSYCHIATRY PROGRESS NOTE - NSBHATTESTAPPBILLTIME_PSY_A_CORE
I attest my time as RICARDO is greater than 50% of the total combined time spent on qualifying patient care activities. I have reviewed and verified the documentation.

## 2025-05-15 NOTE — BH INPATIENT PSYCHIATRY PROGRESS NOTE - NSDCCRITERIA_PSY_ALL_CORE
When pt is no longer an acute or imminent risk of harm to self or others, and is able to care for self safely, pt may then be discharged. 

## 2025-05-15 NOTE — BH INPATIENT PSYCHIATRY PROGRESS NOTE - NSTXDCOPNODATEEST_PSY_ALL_CORE
16-Apr-2025
16-Apr-2025
23-Apr-2025
30-Apr-2025
07-May-2025
16-Apr-2025
14-May-2025
23-Apr-2025
07-May-2025
07-May-2025
16-Apr-2025
14-May-2025
23-Apr-2025
30-Apr-2025
07-May-2025
30-Apr-2025
16-Apr-2025
07-May-2025
23-Apr-2025
30-Apr-2025
30-Apr-2025

## 2025-05-15 NOTE — BH TREATMENT PLAN - NSTXHYPERINTERRN_PSY_ALL_CORE
Educate patient regarding the importace of vital signs monitoring and encourage to allow staff to assess vitals. Monitor vital signs daily. Administer medication as prescribed

## 2025-05-15 NOTE — BH INPATIENT PSYCHIATRY PROGRESS NOTE - NSBHMETABOLIC_PSY_ALL_CORE_FT
BMI: BMI (kg/m2): 28.4 (05-03-25 @ 08:11)  HbA1c: A1C with Estimated Average Glucose Result: 5.2 % (04-23-25 @ 12:32)    Glucose:   BP: --Vital Signs Last 24 Hrs  T(C): 36.4 (05-15-25 @ 08:06), Max: 36.4 (05-15-25 @ 08:06)  T(F): 97.6 (05-15-25 @ 08:06), Max: 97.6 (05-15-25 @ 08:06)  HR: --  BP: --  BP(mean): --  RR: 18 (05-15-25 @ 08:06) (18 - 18)  SpO2: --    Orthostatic VS  05-15-25 @ 08:06  Lying BP: --/-- HR: --  Sitting BP: 132/97 HR: 75  Standing BP: 121/87 HR: 92  Site: --  Mode: --    Lipid Panel: Date/Time: 04-23-25 @ 12:32  Cholesterol, Serum: 207  LDL Cholesterol Calculated: 138  HDL Cholesterol, Serum: 56  Total Cholesterol/HDL Ration Measurement: --  Triglycerides, Serum: 76

## 2025-05-15 NOTE — BH INPATIENT PSYCHIATRY PROGRESS NOTE - NSICDXBHTERTIARYDX_PSY_ALL_CORE
R/O Mood disorder with psychosis   F38  

## 2025-05-15 NOTE — BH INPATIENT PSYCHIATRY PROGRESS NOTE - NSTXPSYCHOINTERMD_PSY_ALL_CORE
medications + therapy

## 2025-05-15 NOTE — BH TREATMENT PLAN - NSTXMEDICGOAL_PSY_ALL_CORE
Be able to describe the benefit of medication/treatment

## 2025-05-15 NOTE — BH INPATIENT PSYCHIATRY PROGRESS NOTE - NSTXDCOPNOPROGRES_PSY_ALL_CORE
No Change
Improving
No Change
Improving

## 2025-05-15 NOTE — BH INPATIENT PSYCHIATRY PROGRESS NOTE - NSBHCHARTREVIEWVS_PSY_A_CORE FT
Vital Signs Last 24 Hrs  T(C): 36.4 (05-15-25 @ 08:06), Max: 36.4 (05-15-25 @ 08:06)  T(F): 97.6 (05-15-25 @ 08:06), Max: 97.6 (05-15-25 @ 08:06)  HR: --  BP: --  BP(mean): --  RR: 18 (05-15-25 @ 08:06) (18 - 18)  SpO2: --    Orthostatic VS  05-15-25 @ 08:06  Lying BP: --/-- HR: --  Sitting BP: 132/97 HR: 75  Standing BP: 121/87 HR: 92  Site: --  Mode: --

## 2025-05-15 NOTE — BH INPATIENT PSYCHIATRY PROGRESS NOTE - NSTXMEDICGOAL_PSY_ALL_CORE
Be able to describe the benefit of medication/treatment

## 2025-05-15 NOTE — BH INPATIENT PSYCHIATRY PROGRESS NOTE - NSTXMEDICDATEEST_PSY_ALL_CORE
14-Apr-2025

## 2025-05-15 NOTE — BH INPATIENT PSYCHIATRY PROGRESS NOTE - NSTXDCOTHRPROGRES_PSY_ALL_CORE
Improving
Improving
No Change
No Change
Improving
No Change
Improving
No Change
No Change
Improving
No Change

## 2025-05-15 NOTE — BH INPATIENT PSYCHIATRY PROGRESS NOTE - NSTXHYPERDATETRGT_PSY_ALL_CORE
15-May-2025
15-May-2025
01-May-2025
08-May-2025
01-May-2025
15-May-2025
15-May-2025
08-May-2025
15-May-2025
01-May-2025
01-May-2025
08-May-2025
01-May-2025

## 2025-05-15 NOTE — BH TREATMENT PLAN - NSTXPSYCHOINTERRN_PSY_ALL_CORE
Redirect and reorient as needed. Administer medication as prescribed.

## 2025-05-15 NOTE — BH TREATMENT PLAN - NSTXDCOPNOINTERSW_PSY_ALL_CORE
SW will meet with pt to provide psychoed and support towards pt's goal. SW will explore pt's lapse in care and create consistent outpt goals. SW meet with multidisciplinary team daily for updates on pt's goal progress.
Support and psychoed being provided and d/c plans to be arranged when appropriate.

## 2025-05-15 NOTE — BH INPATIENT PSYCHIATRY PROGRESS NOTE - PRN MEDS
MEDICATIONS  (PRN):  acetaminophen     Tablet .. 650 milliGRAM(s) Oral every 6 hours PRN Mild Pain (1 - 3), Moderate Pain (4 - 6)  hydrOXYzine hydrochloride 50 milliGRAM(s) Oral every 8 hours PRN anxiety  LORazepam     Tablet 2 milliGRAM(s) Oral every 6 hours PRN agitation  LORazepam   Injectable 2 milliGRAM(s) IntraMuscular once PRN severe agitation  melatonin. 3 milliGRAM(s) Oral at bedtime PRN Insomnia  
MEDICATIONS  (PRN):  acetaminophen     Tablet .. 650 milliGRAM(s) Oral every 6 hours PRN Mild Pain (1 - 3), Moderate Pain (4 - 6)  aluminum hydroxide/magnesium hydroxide/simethicone Suspension 30 milliLiter(s) Oral every 6 hours PRN Dyspepsia  hydrOXYzine hydrochloride 50 milliGRAM(s) Oral every 8 hours PRN anxiety  LORazepam     Tablet 2 milliGRAM(s) Oral every 6 hours PRN agitation  LORazepam   Injectable 2 milliGRAM(s) IntraMuscular once PRN severe agitation  LORazepam   Injectable 2 milliGRAM(s) IntraMuscular once PRN Catatonia  magnesium hydroxide Suspension 30 milliLiter(s) Oral daily PRN Constipation  melatonin. 3 milliGRAM(s) Oral at bedtime PRN Insomnia  OLANZapine Injectable 5 milliGRAM(s) IntraMuscular at bedtime PRN TOO  
MEDICATIONS  (PRN):  acetaminophen     Tablet .. 650 milliGRAM(s) Oral every 6 hours PRN Mild Pain (1 - 3), Moderate Pain (4 - 6)  aluminum hydroxide/magnesium hydroxide/simethicone Suspension 30 milliLiter(s) Oral every 6 hours PRN Dyspepsia  hydrOXYzine hydrochloride 50 milliGRAM(s) Oral every 8 hours PRN anxiety  LORazepam     Tablet 2 milliGRAM(s) Oral every 6 hours PRN agitation  LORazepam   Injectable 2 milliGRAM(s) IntraMuscular once PRN severe agitation  LORazepam   Injectable 2 milliGRAM(s) IntraMuscular once PRN Catatonia  magnesium hydroxide Suspension 30 milliLiter(s) Oral daily PRN Constipation  melatonin. 3 milliGRAM(s) Oral at bedtime PRN Insomnia  OLANZapine Injectable 5 milliGRAM(s) IntraMuscular at bedtime PRN TOO  OLANZapine Injectable 2.5 milliGRAM(s) IntraMuscular daily PRN TOO  
MEDICATIONS  (PRN):  acetaminophen     Tablet .. 650 milliGRAM(s) Oral every 6 hours PRN Mild Pain (1 - 3), Moderate Pain (4 - 6)  aluminum hydroxide/magnesium hydroxide/simethicone Suspension 30 milliLiter(s) Oral every 6 hours PRN Dyspepsia  hydrOXYzine hydrochloride 50 milliGRAM(s) Oral every 8 hours PRN anxiety  LORazepam     Tablet 2 milliGRAM(s) Oral every 6 hours PRN agitation  LORazepam   Injectable 2 milliGRAM(s) IntraMuscular once PRN severe agitation  LORazepam   Injectable 2 milliGRAM(s) IntraMuscular once PRN Catatonia  magnesium hydroxide Suspension 30 milliLiter(s) Oral daily PRN Constipation  melatonin. 3 milliGRAM(s) Oral at bedtime PRN Insomnia  OLANZapine Injectable 5 milliGRAM(s) IntraMuscular at bedtime PRN TOO  OLANZapine Injectable 2.5 milliGRAM(s) IntraMuscular daily PRN TOO  
MEDICATIONS  (PRN):  acetaminophen     Tablet .. 650 milliGRAM(s) Oral every 6 hours PRN Mild Pain (1 - 3), Moderate Pain (4 - 6)  hydrOXYzine hydrochloride 50 milliGRAM(s) Oral every 8 hours PRN anxiety  LORazepam     Tablet 2 milliGRAM(s) Oral every 6 hours PRN agitation  LORazepam   Injectable 2 milliGRAM(s) IntraMuscular once PRN severe agitation  LORazepam   Injectable 2 milliGRAM(s) IntraMuscular once PRN Catatonia  melatonin. 3 milliGRAM(s) Oral at bedtime PRN Insomnia  OLANZapine Injectable 5 milliGRAM(s) IntraMuscular once PRN TOO court order for psychosis  
MEDICATIONS  (PRN):  acetaminophen     Tablet .. 650 milliGRAM(s) Oral every 6 hours PRN Mild Pain (1 - 3), Moderate Pain (4 - 6)  hydrOXYzine hydrochloride 50 milliGRAM(s) Oral every 8 hours PRN anxiety  LORazepam     Tablet 2 milliGRAM(s) Oral every 6 hours PRN agitation  LORazepam   Injectable 2 milliGRAM(s) IntraMuscular once PRN severe agitation  LORazepam   Injectable 2 milliGRAM(s) IntraMuscular once PRN Catatonia  melatonin. 3 milliGRAM(s) Oral at bedtime PRN Insomnia  OLANZapine Injectable 5 milliGRAM(s) IntraMuscular at bedtime PRN TOO  OLANZapine Injectable 2.5 milliGRAM(s) IntraMuscular daily PRN TOO  
MEDICATIONS  (PRN):  acetaminophen     Tablet .. 650 milliGRAM(s) Oral every 6 hours PRN Mild Pain (1 - 3), Moderate Pain (4 - 6)  hydrOXYzine hydrochloride 50 milliGRAM(s) Oral every 8 hours PRN anxiety  LORazepam     Tablet 2 milliGRAM(s) Oral every 6 hours PRN agitation  LORazepam   Injectable 2 milliGRAM(s) IntraMuscular once PRN severe agitation  melatonin. 3 milliGRAM(s) Oral at bedtime PRN Insomnia  
MEDICATIONS  (PRN):  acetaminophen     Tablet .. 650 milliGRAM(s) Oral every 6 hours PRN Mild Pain (1 - 3), Moderate Pain (4 - 6)  hydrOXYzine hydrochloride 50 milliGRAM(s) Oral every 8 hours PRN anxiety  LORazepam     Tablet 2 milliGRAM(s) Oral every 6 hours PRN agitation  LORazepam   Injectable 2 milliGRAM(s) IntraMuscular once PRN severe agitation  LORazepam   Injectable 2 milliGRAM(s) IntraMuscular once PRN Catatonia  melatonin. 3 milliGRAM(s) Oral at bedtime PRN Insomnia  OLANZapine Injectable 5 milliGRAM(s) IntraMuscular once PRN TOO court order for psychosis  
MEDICATIONS  (PRN):  acetaminophen     Tablet .. 650 milliGRAM(s) Oral every 6 hours PRN Mild Pain (1 - 3), Moderate Pain (4 - 6)  aluminum hydroxide/magnesium hydroxide/simethicone Suspension 30 milliLiter(s) Oral every 6 hours PRN Dyspepsia  hydrOXYzine hydrochloride 50 milliGRAM(s) Oral every 8 hours PRN anxiety  LORazepam     Tablet 2 milliGRAM(s) Oral every 6 hours PRN agitation  LORazepam   Injectable 2 milliGRAM(s) IntraMuscular once PRN severe agitation  LORazepam   Injectable 2 milliGRAM(s) IntraMuscular once PRN Catatonia  magnesium hydroxide Suspension 30 milliLiter(s) Oral daily PRN Constipation  melatonin. 3 milliGRAM(s) Oral at bedtime PRN Insomnia  OLANZapine Injectable 5 milliGRAM(s) IntraMuscular at bedtime PRN TOO  OLANZapine Injectable 2.5 milliGRAM(s) IntraMuscular daily PRN TOO  
MEDICATIONS  (PRN):  acetaminophen     Tablet .. 650 milliGRAM(s) Oral every 6 hours PRN Mild Pain (1 - 3), Moderate Pain (4 - 6)  hydrOXYzine hydrochloride 50 milliGRAM(s) Oral every 8 hours PRN anxiety  LORazepam     Tablet 2 milliGRAM(s) Oral every 6 hours PRN agitation  LORazepam   Injectable 2 milliGRAM(s) IntraMuscular once PRN severe agitation  LORazepam   Injectable 2 milliGRAM(s) IntraMuscular once PRN Catatonia  melatonin. 3 milliGRAM(s) Oral at bedtime PRN Insomnia  OLANZapine Injectable 5 milliGRAM(s) IntraMuscular at bedtime PRN TOO  OLANZapine Injectable 2.5 milliGRAM(s) IntraMuscular daily PRN TOO  
MEDICATIONS  (PRN):  acetaminophen     Tablet .. 650 milliGRAM(s) Oral every 6 hours PRN Mild Pain (1 - 3), Moderate Pain (4 - 6)  hydrOXYzine hydrochloride 50 milliGRAM(s) Oral every 8 hours PRN anxiety  LORazepam     Tablet 2 milliGRAM(s) Oral every 6 hours PRN agitation  LORazepam   Injectable 2 milliGRAM(s) IntraMuscular once PRN severe agitation  LORazepam   Injectable 2 milliGRAM(s) IntraMuscular once PRN Catatonia  melatonin. 3 milliGRAM(s) Oral at bedtime PRN Insomnia  OLANZapine Injectable 5 milliGRAM(s) IntraMuscular once PRN TOO court order for psychosis  
MEDICATIONS  (PRN):  acetaminophen     Tablet .. 650 milliGRAM(s) Oral every 6 hours PRN Mild Pain (1 - 3), Moderate Pain (4 - 6)  hydrOXYzine hydrochloride 50 milliGRAM(s) Oral every 8 hours PRN anxiety  LORazepam     Tablet 2 milliGRAM(s) Oral every 6 hours PRN agitation  LORazepam   Injectable 2 milliGRAM(s) IntraMuscular once PRN severe agitation  melatonin. 3 milliGRAM(s) Oral at bedtime PRN Insomnia  
MEDICATIONS  (PRN):  acetaminophen     Tablet .. 650 milliGRAM(s) Oral every 6 hours PRN Mild Pain (1 - 3), Moderate Pain (4 - 6)  hydrOXYzine hydrochloride 50 milliGRAM(s) Oral every 8 hours PRN anxiety  LORazepam     Tablet 2 milliGRAM(s) Oral every 6 hours PRN agitation  LORazepam   Injectable 2 milliGRAM(s) IntraMuscular once PRN severe agitation  LORazepam   Injectable 2 milliGRAM(s) IntraMuscular once PRN Catatonia  melatonin. 3 milliGRAM(s) Oral at bedtime PRN Insomnia  OLANZapine Injectable 5 milliGRAM(s) IntraMuscular once PRN TOO court order for psychosis  
MEDICATIONS  (PRN):  acetaminophen     Tablet .. 650 milliGRAM(s) Oral every 6 hours PRN Mild Pain (1 - 3), Moderate Pain (4 - 6)  aluminum hydroxide/magnesium hydroxide/simethicone Suspension 30 milliLiter(s) Oral every 6 hours PRN Dyspepsia  hydrOXYzine hydrochloride 50 milliGRAM(s) Oral every 8 hours PRN anxiety  LORazepam     Tablet 2 milliGRAM(s) Oral every 6 hours PRN agitation  LORazepam   Injectable 2 milliGRAM(s) IntraMuscular once PRN severe agitation  LORazepam   Injectable 2 milliGRAM(s) IntraMuscular once PRN Catatonia  magnesium hydroxide Suspension 30 milliLiter(s) Oral daily PRN Constipation  melatonin. 3 milliGRAM(s) Oral at bedtime PRN Insomnia  OLANZapine Injectable 5 milliGRAM(s) IntraMuscular at bedtime PRN TOO  OLANZapine Injectable 2.5 milliGRAM(s) IntraMuscular daily PRN TOO  
MEDICATIONS  (PRN):  acetaminophen     Tablet .. 650 milliGRAM(s) Oral every 6 hours PRN Mild Pain (1 - 3), Moderate Pain (4 - 6)  aluminum hydroxide/magnesium hydroxide/simethicone Suspension 30 milliLiter(s) Oral every 6 hours PRN Dyspepsia  hydrOXYzine hydrochloride 50 milliGRAM(s) Oral every 8 hours PRN anxiety  LORazepam     Tablet 2 milliGRAM(s) Oral every 6 hours PRN agitation  LORazepam   Injectable 2 milliGRAM(s) IntraMuscular once PRN severe agitation  LORazepam   Injectable 2 milliGRAM(s) IntraMuscular once PRN Catatonia  magnesium hydroxide Suspension 30 milliLiter(s) Oral daily PRN Constipation  melatonin. 3 milliGRAM(s) Oral at bedtime PRN Insomnia  OLANZapine Injectable 5 milliGRAM(s) IntraMuscular at bedtime PRN TOO  
MEDICATIONS  (PRN):  acetaminophen     Tablet .. 650 milliGRAM(s) Oral every 6 hours PRN Mild Pain (1 - 3), Moderate Pain (4 - 6)  aluminum hydroxide/magnesium hydroxide/simethicone Suspension 30 milliLiter(s) Oral every 6 hours PRN Dyspepsia  hydrOXYzine hydrochloride 50 milliGRAM(s) Oral every 8 hours PRN anxiety  LORazepam     Tablet 2 milliGRAM(s) Oral every 6 hours PRN agitation  LORazepam   Injectable 2 milliGRAM(s) IntraMuscular once PRN severe agitation  LORazepam   Injectable 2 milliGRAM(s) IntraMuscular once PRN Catatonia  magnesium hydroxide Suspension 30 milliLiter(s) Oral daily PRN Constipation  melatonin. 3 milliGRAM(s) Oral at bedtime PRN Insomnia  OLANZapine Injectable 5 milliGRAM(s) IntraMuscular at bedtime PRN TOO  OLANZapine Injectable 2.5 milliGRAM(s) IntraMuscular daily PRN TOO  
MEDICATIONS  (PRN):  acetaminophen     Tablet .. 650 milliGRAM(s) Oral every 6 hours PRN Mild Pain (1 - 3), Moderate Pain (4 - 6)  hydrOXYzine hydrochloride 50 milliGRAM(s) Oral every 8 hours PRN anxiety  LORazepam     Tablet 2 milliGRAM(s) Oral every 6 hours PRN agitation  LORazepam   Injectable 2 milliGRAM(s) IntraMuscular once PRN severe agitation  melatonin. 3 milliGRAM(s) Oral at bedtime PRN Insomnia  
MEDICATIONS  (PRN):  acetaminophen     Tablet .. 650 milliGRAM(s) Oral every 6 hours PRN Mild Pain (1 - 3), Moderate Pain (4 - 6)  aluminum hydroxide/magnesium hydroxide/simethicone Suspension 30 milliLiter(s) Oral every 6 hours PRN Dyspepsia  hydrOXYzine hydrochloride 50 milliGRAM(s) Oral every 8 hours PRN anxiety  LORazepam     Tablet 2 milliGRAM(s) Oral every 6 hours PRN agitation  LORazepam   Injectable 2 milliGRAM(s) IntraMuscular once PRN severe agitation  LORazepam   Injectable 2 milliGRAM(s) IntraMuscular once PRN Catatonia  magnesium hydroxide Suspension 30 milliLiter(s) Oral daily PRN Constipation  melatonin. 3 milliGRAM(s) Oral at bedtime PRN Insomnia  OLANZapine Injectable 5 milliGRAM(s) IntraMuscular at bedtime PRN TOO  
MEDICATIONS  (PRN):  acetaminophen     Tablet .. 650 milliGRAM(s) Oral every 6 hours PRN Mild Pain (1 - 3), Moderate Pain (4 - 6)  aluminum hydroxide/magnesium hydroxide/simethicone Suspension 30 milliLiter(s) Oral every 6 hours PRN Dyspepsia  hydrOXYzine hydrochloride 50 milliGRAM(s) Oral every 8 hours PRN anxiety  LORazepam     Tablet 2 milliGRAM(s) Oral every 6 hours PRN agitation  LORazepam   Injectable 2 milliGRAM(s) IntraMuscular once PRN severe agitation  LORazepam   Injectable 2 milliGRAM(s) IntraMuscular once PRN Catatonia  magnesium hydroxide Suspension 30 milliLiter(s) Oral daily PRN Constipation  melatonin. 3 milliGRAM(s) Oral at bedtime PRN Insomnia  OLANZapine Injectable 5 milliGRAM(s) IntraMuscular at bedtime PRN TOO  
MEDICATIONS  (PRN):  acetaminophen     Tablet .. 650 milliGRAM(s) Oral every 6 hours PRN Mild Pain (1 - 3), Moderate Pain (4 - 6)  hydrOXYzine hydrochloride 50 milliGRAM(s) Oral every 8 hours PRN anxiety  LORazepam     Tablet 2 milliGRAM(s) Oral every 6 hours PRN agitation  LORazepam   Injectable 2 milliGRAM(s) IntraMuscular once PRN severe agitation  LORazepam   Injectable 2 milliGRAM(s) IntraMuscular once PRN Catatonia  melatonin. 3 milliGRAM(s) Oral at bedtime PRN Insomnia  OLANZapine Injectable 5 milliGRAM(s) IntraMuscular once PRN TOO court order for psychosis  
MEDICATIONS  (PRN):  acetaminophen     Tablet .. 650 milliGRAM(s) Oral every 6 hours PRN Mild Pain (1 - 3), Moderate Pain (4 - 6)  aluminum hydroxide/magnesium hydroxide/simethicone Suspension 30 milliLiter(s) Oral every 6 hours PRN Dyspepsia  hydrOXYzine hydrochloride 50 milliGRAM(s) Oral every 8 hours PRN anxiety  LORazepam     Tablet 2 milliGRAM(s) Oral every 6 hours PRN agitation  LORazepam   Injectable 2 milliGRAM(s) IntraMuscular once PRN severe agitation  LORazepam   Injectable 2 milliGRAM(s) IntraMuscular once PRN Catatonia  magnesium hydroxide Suspension 30 milliLiter(s) Oral daily PRN Constipation  melatonin. 3 milliGRAM(s) Oral at bedtime PRN Insomnia  OLANZapine Injectable 5 milliGRAM(s) IntraMuscular at bedtime PRN TOO

## 2025-05-15 NOTE — BH INPATIENT PSYCHIATRY PROGRESS NOTE - NSBHLEGALSTATUSNEW_PSY_ALL_CORE
9.27 (2PC)

## 2025-05-15 NOTE — BH INPATIENT PSYCHIATRY PROGRESS NOTE - NSTXPSYCHODATETRGT_PSY_ALL_CORE
24-Apr-2025
08-May-2025
24-Apr-2025
08-May-2025
01-May-2025
08-May-2025
24-Apr-2025
24-Apr-2025
08-May-2025
01-May-2025
08-May-2025
08-May-2025
01-May-2025
01-May-2025
08-May-2025
01-May-2025

## 2025-05-15 NOTE — BH INPATIENT PSYCHIATRY PROGRESS NOTE - NSBHCONSBHPROVDETAILS_PSY_A_CORE  FT
Dr. Gandara 

## 2025-05-15 NOTE — BH INPATIENT PSYCHIATRY PROGRESS NOTE - MSE UNSTRUCTURED FT
Appearance: Dressed appropriately.  Attitude / Behavior / relatedness: oddly-related. hypervigilant   Eye contact: poor; staring blankly   Motor: No abnormal movements, no psychomotor slowing or activation.  Speech: Regular rate.  Mood: "fine"  Affect: guarded.   Thought Process: unable to assess   Thought Content: impoverished  Perceptual: appears internally preoccupied   Insight: poor   Judgment: impaired  Impulse control:  intact  Cognition: grossly intact though not formally tested  Gait: Intact. 
Appearance: Dressed appropriately.  Attitude / Behavior / relatedness: oddly-related. hypervigilant   Eye contact: poor; staring blankly   Motor: No abnormal movements, no psychomotor slowing or activation.  Speech: Regular rate.  Mood: "fine"  Affect: guarded.   Thought Process: unable to assess   Thought Content: impoverished / pt would not participate in interview today  Perceptual: appears internally preoccupied   Insight: poor   Judgment: impaired  Impulse control:  intact  Cognition: grossly intact though not formally tested  Gait: Intact. 
Appearance: Dressed appropriately.  Attitude / Behavior / relatedness: oddly-related. hypervigilant   Eye contact: poor; staring blankly   Motor: No abnormal movements, no psychomotor slowing or activation.  Speech: Regular rate.  Mood: "fine"  Affect: guarded.   Thought Process: unable to assess   Thought Content: impoverished  Perceptual: appears internally preoccupied   Insight: poor   Judgment: impaired  Impulse control:  intact  Cognition: grossly intact though not formally tested  Gait: Intact. 

## 2025-05-15 NOTE — BH TREATMENT PLAN - NSPTSTATEDGOAL_PSY_ALL_CORE
to eat more 
Patient seeks psychiatric stabilization to return to the community with appropriate aftercare and supports.
to eat more 
to eat more 
Patient seeks psychiatric stabilization to return to the community with appropriate aftercare and supports.

## 2025-05-15 NOTE — BH INPATIENT PSYCHIATRY PROGRESS NOTE - NSTXMEDICDATETRGT_PSY_ALL_CORE
01-May-2025
08-May-2025
15-May-2025
15-May-2025
24-Apr-2025
15-May-2025
08-May-2025
08-May-2025
15-May-2025
08-May-2025
01-May-2025
01-May-2025
24-Apr-2025
01-May-2025
08-May-2025
15-May-2025
01-May-2025
24-Apr-2025
24-Apr-2025
08-May-2025

## 2025-05-15 NOTE — BH INPATIENT PSYCHIATRY PROGRESS NOTE - NSTXDCOPNODATETRGT_PSY_ALL_CORE
15-May-2025
23-Apr-2025
23-Apr-2025
15-May-2025
01-May-2025
08-May-2025
08-May-2025
23-Apr-2025
14-May-2025
01-May-2025
15-May-2025
07-May-2025
23-Apr-2025
23-Apr-2025
14-May-2025
01-May-2025
08-May-2025
08-May-2025
21-May-2025
01-May-2025
21-May-2025

## 2025-05-15 NOTE — BH INPATIENT PSYCHIATRY PROGRESS NOTE - MSE OPTIONS
Unstructured MSE

## 2025-05-15 NOTE — BH INPATIENT PSYCHIATRY PROGRESS NOTE - NSTXDCOTHRDATEEST_PSY_ALL_CORE
07-May-2025
23-Apr-2025
23-Apr-2025
30-Apr-2025
07-May-2025
30-Apr-2025
23-Apr-2025
30-Apr-2025
23-Apr-2025
16-Apr-2025
07-May-2025
14-May-2025
16-Apr-2025
14-May-2025
30-Apr-2025
07-May-2025
16-Apr-2025
30-Apr-2025
07-May-2025

## 2025-05-15 NOTE — BH INPATIENT PSYCHIATRY PROGRESS NOTE - NSTXDISORGDATEEST_PSY_ALL_CORE
15-Apr-2025

## 2025-05-15 NOTE — BH INPATIENT PSYCHIATRY PROGRESS NOTE - NSTXDISORGDATETRGT_PSY_ALL_CORE
22-Apr-2025
109
15-May-2025
01-May-2025
20-May-2025
22-Apr-2025
28-Apr-2025
01-May-2025
13-May-2025
22-Apr-2025
28-Apr-2025
06-May-2025
08-May-2025
08-May-2025
28-Apr-2025
01-May-2025
20-May-2025
06-May-2025
13-May-2025
20-May-2025
13-May-2025
15-May-2025
08-May-2025

## 2025-05-15 NOTE — BH TREATMENT PLAN - NSTXPATIENTPARTICIPATE_PSY_ALL_CORE
Patient participated in defining interventions

## 2025-05-15 NOTE — BH INPATIENT PSYCHIATRY PROGRESS NOTE - NSTXDCOTHRGOAL_PSY_ALL_CORE
Patient will report improved mood and insight into coping skills for symptoms, and with no SIIP.
Sx improvement
Patient will report improved mood and insight into coping skills for symptoms, and with no SIIP.
Sx improvement
Patient will report improved mood and insight into coping skills for symptoms, and with no SIIP.
Sx improvement
Patient will report improved mood and insight into coping skills for symptoms, and with no SIIP.
Sx improvement
Patient will report improved mood and insight into coping skills for symptoms, and with no SIIP.
Patient will report improved mood and insight into coping skills for symptoms, and with no SIIP.
Sx improvement
Patient will report improved mood and insight into coping skills for symptoms, and with no SIIP.
Patient will report improved mood and insight into coping skills for symptoms, and with no SIIP.

## 2025-05-15 NOTE — BH TREATMENT PLAN - NSTXDCOTHRINTERSW_PSY_ALL_CORE
SW will provide patient and her spouse/family with support, psycho-education, and discharge planning; while coordinating care with interdisciplinary treatment team.
Support and psychoed being provided and d/c plans to be arranged when appropriate.
SW will provide patient and her spouse/family with support, psycho-education, and discharge planning; while coordinating care with interdisciplinary treatment team.
SW will provide patient and her spouse/family with support, psycho-education, and discharge planning; while coordinating care with interdisciplinary treatment team.

## 2025-05-16 VITALS — TEMPERATURE: 98 F

## 2025-05-16 PROCEDURE — 99231 SBSQ HOSP IP/OBS SF/LOW 25: CPT

## 2025-05-16 NOTE — BH DISCHARGE NOTE NURSING/SOCIAL WORK/PSYCH REHAB - DISCHARGE INSTRUCTIONS AFTERCARE APPOINTMENTS
In order to check the location, date, or time of your aftercare appointment, please refer to your Discharge Instructions Document given to you upon leaving the hospital.  If you have lost the instructions please call [phone]

## 2025-05-16 NOTE — BH INPATIENT PSYCHIATRY DISCHARGE NOTE - REASON FOR ADMISSION
Patient was admitted for inability to care for bizarre behavior, impaired sleep and appetite due to non compliance with medications and treatment.

## 2025-05-16 NOTE — BH INPATIENT PSYCHIATRY DISCHARGE NOTE - HOSPITAL COURSE
Dates of hospitalization:  4/14/25-5/16/25     On admission interview, patient presented with the following signs and symptoms:  Interview strained due to patient’s suspiciousness as well as her reluctance to be interviewed and she repeatedly stated "I don’t want to talk right now." Patient known to writer from her previous admission on 2N. Patient relates being discharged last month and being intermittently compliant with her Risperdal. She states she stopped taking it but cannot explain why. She reports since discharge, she had begun plans to work again but would become guarded about any specifics. She reports coming to the hospital due to her mom insisting her, though patient states she does not wish for treatment team to speak with her at this time. Patient guarded about her symptoms, states that she is only anxious, not depressed, and denies psychotic or manic symptoms. she denies substance use. She is agreeable to restarting Risperdal.      Patient was started on Zyprexa which was titrated to a dose of 7.5mg with good tolerability. Patient’s symptoms stabilized during hospitalization with one dose of Ativan 2mg for suspicion of Catatonia. At time of discharge, patient ready to go home and seek further care as an outpatient (never endorsed current or past SI).      There were no behavioral problems on the unit.  Patient did not become agitated and did not require emergent intramuscular medications or seclusion/restraints. Patient did not self-harm on the unit. Patient remained actively engaged in treatment. Patient participated in individual, group, and milieu therapy. Patient got along appropriately with staff and peers. Mother was contacted at time of admission to obtain collateral. Safety planning and disposition planning were performed.  Patient did not have any medical problems during this hospitalization.  There were no medical consultations.       On day of discharge, the patient has improved significantly and no longer requires inpatient treatment and care. Patient denies all suicidal and aggressive ideation, intent and plan. Patient displays no psychotic symptoms. Patient is not judged to be an acute danger to self or others at this time. She is stable for transition to outpatient level of care.         Risk Assessment: The patient is at chronic risk of harm to self and others given diagnosis of Psychosis, anxiety in addition to current psychiatric hospitalization. One prior hospitalization. Past non compliance with medications. Required court ordered treatment over object to take medications.      Protective factors include no access to firearms, Mother, career as , plans to obtain certifications, stable housing, patient engagement with treatment and desire to obtain treatment.       On admission the patient was felt to be at an acutely elevated risk of harm to self or others as they were suffering from posturing, thought block, internally preoccupied, impaired sleep and appetite, poor ADLs without abortive medication or pharmacotherapy established with the goal of treating/preventing them. Over the hospital course medications were started and patient was set up with after-care plans.     Although patient has an elevated chronic risk of harm to self or others, acute risk has been addressed during inpatient hospitalization. Further hospitalization is no longer warranted. Patient is ready for transition to outpatient care for further management.        Patient will be discharged with the following DSM5 diagnoses:   1. Psychosis	  2. Anxiety	     Patient will be discharged on the following medications:   1. Zyprexa 7,5mg at bedtime - 15 days	  2. Melatonin 3mg at bedtime - 15 days   3. Atarax 50mg BID PRN -15 days 	           1.	Will d/c home on current regimen. 2. Psychoeducation provided regarding importance of compliance with outpatient appointments and medications. 3. Pt was advised to reduce substance use (MJ/alcohol). 4. Pt was advised to dial 911, return to the ER, or visit the [facility] if they become a danger to self or others or need urgent help.    Patient was in agreement with continuing treatment and attending outpatient appointment. Educated patient about crisis center as a resource and calling 911 or going to the emergency room if there is a safety concern.  Discharge summary faxed to (provider/clinic). Discussed treatment plan and meds included my contact information Eloisa Post NP, [phone].     Discharge summary emailed to [facility]. Discussed treatment plan and meds included my contact information Eloisa Post NP, [phone].     Dates of hospitalization:  4/14/25-5/16/25     On admission interview, patient presented with the following signs and symptoms:  Interview strained due to patient’s suspiciousness as well as her reluctance to be interviewed and she repeatedly stated "I don’t want to talk right now." Patient known to writer from her previous admission on 2N. Patient relates being discharged last month and being intermittently compliant with her Risperdal. She states she stopped taking it but cannot explain why. She reports since discharge, she had begun plans to work again but would become guarded about any specifics. She reports coming to the hospital due to her mom insisting her, though patient states she does not wish for treatment team to speak with her at this time. Patient guarded about her symptoms, states that she is only anxious, not depressed, and denies psychotic or manic symptoms. she denies substance use. She is agreeable to restarting Risperdal.      Patient was started on Zyprexa which was titrated to a dose of 7.5mg with good tolerability. Patient’s symptoms stabilized during hospitalization with one dose of Ativan 2mg for suspicion of Catatonia. At time of discharge, patient ready to go home and seek further care as an outpatient (never endorsed current or past SI).      There were no behavioral problems on the unit.  Patient did not become agitated and did not require emergent intramuscular medications or seclusion/restraints. Patient did not self-harm on the unit. Patient remained actively engaged in treatment. Patient participated in individual, group, and milieu therapy. Patient got along appropriately with staff and peers. Mother was contacted at time of admission to obtain collateral. Safety planning and disposition planning were performed.  Patient did not have any medical problems during this hospitalization.  There were no medical consultations.       On day of discharge, the patient has improved significantly and no longer requires inpatient treatment and care. Patient denies all suicidal and aggressive ideation, intent and plan. Patient displays no psychotic symptoms. Patient is not judged to be an acute danger to self or others at this time. She is stable for transition to outpatient level of care.         Risk Assessment: The patient is at chronic risk of harm to self and others given diagnosis of Psychosis, anxiety in addition to current psychiatric hospitalization. One prior hospitalization. Past non compliance with medications. Required court ordered treatment over object to take medications.      Protective factors include no access to firearms, Mother, career as , plans to obtain certifications, stable housing, patient engagement with treatment and desire to obtain treatment.       On admission the patient was felt to be at an acutely elevated risk of harm to self or others as they were suffering from posturing, thought block, internally preoccupied, impaired sleep and appetite, poor ADLs without abortive medication or pharmacotherapy established with the goal of treating/preventing them. Over the hospital course medications were started and patient was set up with after-care plans.     Although patient has an elevated chronic risk of harm to self or others, acute risk has been addressed during inpatient hospitalization. Further hospitalization is no longer warranted. Patient is ready for transition to outpatient care for further management.        Patient will be discharged with the following DSM5 diagnoses:   1. Psychosis	  2. Anxiety	     Patient will be discharged on the following medications:   1. Zyprexa 7,5mg at bedtime - 15 days	  2. Melatonin 3mg at bedtime - 15 days   3. Atarax 50mg BID PRN -15 days 	           1.	Will d/c home on current regimen. 2. Psychoeducation provided regarding importance of compliance with outpatient appointments and medications. 3. Pt was advised to reduce substance use (MJ/alcohol). 4. Pt was advised to dial 911, return to the ER, or visit the [facility] if they become a danger to self or others or need urgent help.

## 2025-05-16 NOTE — BH INPATIENT PSYCHIATRY DISCHARGE NOTE - NSBHFUPINTERVALHXFT_PSY_A_CORE
F/u for psychosis. Chart reviewed and case discussed in team meeting.  Patient has been superficially engaged, not attending groups, in bed most of the day. She did come out of breakfast. She does not sit when visiting with Mother. Patient feels safe to be discharged tomorrow. She expressed understanding that she need to continue the medications, she does endorse feeling better on the medication however inquired about having medications changed when she goes to outpatient appointment. She denied SIIP, HIIP, AVH.     F/u for psychosis. Chart reviewed and case discussed in team meeting.  Patient has been superficially engaged, not attending groups, in bed most of the day. She did come out of breakfast. She does not sit when visiting with Mother. Patient feels safe to be discharged tomorrow. She expressed understanding that she need to continue the medications, she does endorse feeling better on the medication however inquired about having medications changed when she goes to outpatient appointment. She denied SIIP, HIIP, AVH.      Patient was in agreement with continuing treatment and attending outpatient appointment. Educated patient about crisis center as a resource and calling 911 or going to the emergency room if there is a safety concern.

## 2025-05-16 NOTE — BH DISCHARGE NOTE NURSING/SOCIAL WORK/PSYCH REHAB - PATIENT PORTAL LINK FT
You can access the FollowMyHealth Patient Portal offered by [facility] by registering at the following website: [email or web address]. By joining [facility]’s FollowMyHealth portal, you will also be able to view your health information using other applications (apps) compatible with our system.

## 2025-05-16 NOTE — BH INPATIENT PSYCHIATRY DISCHARGE NOTE - ATTENDING DISCHARGE PHYSICAL EXAMINATION:
Patient is seen and evaluated, chart is reviewed and discussed with treatment team.  Pt denies SI/HI, no intent or plan, denies AH/VH, does not present as an acute risk to self or others at this time.  Pt is discharged in psychiatrically and medically stable condition.

## 2025-05-16 NOTE — BH INPATIENT PSYCHIATRY DISCHARGE NOTE - NSBHDCHANDOFFFT_PSY_ALL_CORE
Discharge summary emailed to MARICHUY BROOKS. Discussed treatment plan and meds included my contact information Eloisa Post NP, [phone].

## 2025-05-16 NOTE — BH INPATIENT PSYCHIATRY DISCHARGE NOTE - MSE UNSTRUCTURED FT
Appearance: Dressed appropriately.  Attitude / Behavior / relatedness: oddly-related. hypervigilant   Eye contact: poor; staring blankly   Motor: No abnormal movements, no psychomotor slowing or activation.  Speech: Regular rate.  Mood: "fine"  Affect: guarded.   Thought Process: unable to assess   Thought Content: impoverished  Perceptual: appears internally preoccupied   Insight: poor   Judgment: impaired  Impulse control:  intact  Cognition: grossly intact though not formally tested  Gait: Intact.

## 2025-05-16 NOTE — BH INPATIENT PSYCHIATRY DISCHARGE NOTE - OTHER PAST PSYCHIATRIC HISTORY (INCLUDE DETAILS REGARDING ONSET, COURSE OF ILLNESS, INPATIENT/OUTPATIENT TREATMENT)
Pt does not have pertinent changes since last admission -    "Patient is a 34 year old female who lives alone and works as a . Her collateral contact is listed as her mother, Amanda, [phone]. The patient has no previous inpatient psychiatric hospitalizations, but has a history of Anxiety, Depression, and PTSD. She has no reported history of trauma, substance abuse, suicidality/homicidality, SIB, aggression or legal issues. She was in treatment at  after her father . She was in therapy for about ten years ending in May, 2024. She is currently not in outpatient treatment. She has been increasingly anxious with no known trigger. She has had poor appetite, mood, sleep, and interest. She has lost 14 pounds, unintentionally. The patient went to the Crisis Center in September and 2024. The patient has been struggling to leave her house and reports significant social anxiety. She is motivated fort treatment. She, initially, agreed to a trial of medication, but then declined. She appeared anxious, calm and cooperative, but was oddly related and has, reportedly, been seen on the unit responding to internal stimuli. Patient would likely benefit from comprehensive outpatient treatment when she is stable. She does not have current providers. The patient has Medicaid, #[id number]."

## 2025-05-16 NOTE — BH INPATIENT PSYCHIATRY DISCHARGE NOTE - NSDCMRMEDTOKEN_GEN_ALL_CORE_FT
hydrOXYzine hydrochloride 50 mg oral tablet: 1 tab(s) orally 2 times a day as needed for anxiety  melatonin 3 mg oral tablet: 1 tab(s) orally once a day (at bedtime) as needed for Insomnia  OLANZapine 7.5 mg oral tablet: 1 tab(s) orally once a day (at bedtime)
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
## 2025-05-16 NOTE — BH DISCHARGE NOTE NURSING/SOCIAL WORK/PSYCH REHAB - NSCDUDCCRISIS_PSY_A_CORE
[facility]  [phone] [facility]-WELL [phone]  Text "WELL" to 48418  Website: www.[employer]/.[employer] 1 (052) 621-YHTH (2502) Website: www.safehorizon.org/.National Suicide Prevention Lifeline 5 (613) 094-9846/.  [employer]  1 (781) LIF[employer] (069-2667)/.  [facility]  [address] [place] [phone]   Hours:  Monday through Friday from 9 AM to 3 PM/988 Suicide and Crisis Lifeline

## 2025-05-16 NOTE — BH DISCHARGE NOTE NURSING/SOCIAL WORK/PSYCH REHAB - FINANCIAL ASSISTANCE
[facility] provides services at a reduced cost to those who are determined to be eligible through [facility]’s financial assistance program. Information regarding [facility]’s financial assistance program can be found by going to [email or web address] or by calling [phone].

## 2025-05-16 NOTE — BH INPATIENT PSYCHIATRY DISCHARGE NOTE - NSBHMETABOLIC_PSY_ALL_CORE_FT
BMI: BMI (kg/m2): 28.4 (05-03-25 @ 08:11)  HbA1c: A1C with Estimated Average Glucose Result: 5.2 % (04-23-25 @ 12:32)    Glucose:   BP: --Vital Signs Last 24 Hrs  T(C): 36.5 (05-16-25 @ 07:49), Max: 36.5 (05-16-25 @ 07:49)  T(F): 97.7 (05-16-25 @ 07:49), Max: 97.7 (05-16-25 @ 07:49)  HR: --  BP: --  BP(mean): --  RR: --  SpO2: --    Orthostatic VS  05-16-25 @ 07:49  Lying BP: --/-- HR: --  Sitting BP: 131/94 HR: 69  Standing BP: 136/84 HR: 79  Site: --  Mode: --  Orthostatic VS  05-15-25 @ 08:06  Lying BP: --/-- HR: --  Sitting BP: 132/97 HR: 75  Standing BP: 121/87 HR: 92  Site: --  Mode: --    Lipid Panel: Date/Time: 04-23-25 @ 12:32  Cholesterol, Serum: 207  LDL Cholesterol Calculated: 138  HDL Cholesterol, Serum: 56  Total Cholesterol/HDL Ration Measurement: --  Triglycerides, Serum: 76

## 2025-05-16 NOTE — BH DISCHARGE NOTE NURSING/SOCIAL WORK/PSYCH REHAB - NSDCPRGOAL_PSY_ALL_CORE
The patient met her specified goal to make at least 3 goal and reality-oriented statements during therapy for her disorganization of thought/ behavior goal. Progress was evidenced by an improved ability to tolerate safety planning and engaging in appropriate and supportive dialogue with peers during communal engagement. The patient attended 10% of the Psychiatric Rehabilitation groups throughout her stay. The patient completed her safety plan prior to discharge.

## 2025-05-16 NOTE — BH INPATIENT PSYCHIATRY DISCHARGE NOTE - NSDCCPCAREPLAN_GEN_ALL_CORE_FT
PRINCIPAL DISCHARGE DIAGNOSIS  Diagnosis: Psychosis  Assessment and Plan of Treatment:       SECONDARY DISCHARGE DIAGNOSES  Diagnosis: Anxiety  Assessment and Plan of Treatment:     Diagnosis: Insomnia  Assessment and Plan of Treatment:

## 2025-05-19 ENCOUNTER — OUTPATIENT (OUTPATIENT)
Dept: OUTPATIENT SERVICES | Facility: HOSPITAL | Age: 35
LOS: 1 days | Discharge: HOME | End: 2025-05-19
Payer: MEDICAID

## 2025-05-19 PROCEDURE — 90792 PSYCH DIAG EVAL W/MED SRVCS: CPT

## 2025-05-27 PROCEDURE — 90853 GROUP PSYCHOTHERAPY: CPT

## 2025-06-11 DIAGNOSIS — F43.10 POST-TRAUMATIC STRESS DISORDER, UNSPECIFIED: ICD-10-CM

## 2025-06-11 DIAGNOSIS — F39 UNSPECIFIED MOOD [AFFECTIVE] DISORDER: ICD-10-CM
